# Patient Record
Sex: FEMALE | Race: WHITE | Employment: PART TIME | URBAN - METROPOLITAN AREA
[De-identification: names, ages, dates, MRNs, and addresses within clinical notes are randomized per-mention and may not be internally consistent; named-entity substitution may affect disease eponyms.]

---

## 2017-12-07 ENCOUNTER — HOSPITAL ENCOUNTER (OUTPATIENT)
Dept: MAMMOGRAPHY | Age: 57
Discharge: HOME | End: 2017-12-07
Attending: SURGERY
Payer: COMMERCIAL

## 2017-12-07 DIAGNOSIS — Z12.31 VISIT FOR SCREENING MAMMOGRAM: ICD-10-CM

## 2017-12-07 PROCEDURE — 77063 BREAST TOMOSYNTHESIS BI: CPT

## 2017-12-22 ENCOUNTER — OFFICE VISIT (OUTPATIENT)
Dept: OBGYN CLINIC | Age: 57
End: 2017-12-22

## 2017-12-22 VITALS
DIASTOLIC BLOOD PRESSURE: 76 MMHG | WEIGHT: 172 LBS | SYSTOLIC BLOOD PRESSURE: 118 MMHG | BODY MASS INDEX: 30.48 KG/M2 | HEIGHT: 63 IN

## 2017-12-22 DIAGNOSIS — Z13.820 SCREENING FOR OSTEOPOROSIS: ICD-10-CM

## 2017-12-22 DIAGNOSIS — Z01.419 ENCOUNTER FOR GYNECOLOGICAL EXAMINATION WITHOUT ABNORMAL FINDING: Primary | ICD-10-CM

## 2017-12-22 NOTE — PROGRESS NOTES
Annual exam ages 40-58    Ariana Toledo is a ,  65 y.o. female [facility] Patient's last menstrual period was 2012. .    She presents for her annual checkup. She is having no significant problems. With regard to the Gardasil vaccine, she is older than the age for which it is FDA approved. Menstrual status:    Her periods are nonexistent in flow due to menopause. She denies dysmenorrhea. She reports no premenstrual symptoms. Contraception:    The current method of family planning is tubal ligation and post menopausal status. Sexual history:    She  reports that she currently engages in sexual activity and has had male partners. She reports using the following method of birth control/protection: Surgical.    Medical conditions:    Since her last annual GYN exam about one year ago, she has not the following changes in her health history: none. Pap and Mammogram History:    Her most recent Pap smear was normal, obtained 2 year(s) ago. The patient had a recent mammogram on 17 which was negative for malignancy. Breast Cancer History/Substance Abuse: positive breast cancer in herself    Osteoporosis History:    Family history does not include a first or second degree relative with osteopenia or osteoporosis. A bone density scan has not been obtained.     Past Medical History:   Diagnosis Date    Breast cancer ([address]     LEFT breast     Mononucleosis 3/27/13    Radiation therapy complication 3337    left breast ca     Past Surgical History:   Procedure Laterality Date    BIOPSY OF BREAST, NEEDLE CORE  2010    infiltrating ductal carcinoma Dr Yenny Vick COLONOSCOPY      normal, repaeat in 5 years    HX BREAST BIOPSY Left     HX BREAST BIOPSY Left     neg; stereotactic bx    HX BREAST LUMPECTOMY Left     LEFT--Dr. Jules Guthrie    HX CHOLECYSTECTOMY  2008    HX DILATION AND CURETTAGE  14    with hysteroscopy for thickened endometrium  REMOVAL GALLBLADDER           Allergies: Review of patient's allergies indicates no known allergies. Tobacco History:  reports that she is a non-smoker but has been exposed to tobacco smoke. She has never used smokeless tobacco.  Alcohol Abuse:  reports that she does not drink alcohol. Drug Abuse:  reports that she does not use illicit drugs.     Family Medical/Cancer History:   Family History   Problem Relation Age of Onset    Stomach Cancer Mother     Breast Cancer Other      Aunt    Breast Cancer Maternal Aunt 79        Review of Systems - History obtained from the patient  Constitutional: negative for weight loss, fever, night sweats  HEENT: negative for hearing loss, earache, congestion, snoring, sorethroat  CV: negative for chest pain, palpitations, edema  Resp: negative for cough, shortness of breath, wheezing  GI: negative for change in bowel habits, abdominal pain, black or bloody stools  : negative for frequency, dysuria, hematuria, vaginal discharge  MSK: negative for back pain, joint pain, muscle pain  Breast: negative for breast lumps, nipple discharge, galactorrhea  Skin :negative for itching, rash, hives  Neuro: negative for dizziness, headache, confusion, weakness  Psych: negative for anxiety, depression, change in mood  Heme/lymph: negative for bleeding, bruising, pallor    Physical Exam    Visit Vitals    /76    Ht 5' 3\" (1.6 m)    Wt 172 lb (78 kg)    LMP 11/30/2012    BMI 30.47 kg/m2       Constitutional  · Appearance: well-nourished, well developed, alert, in no acute distress    HENT  · Head and Face: appears normal    Neck  · Inspection/Palpation: normal appearance, no masses or tenderness  · Lymph Nodes: no lymphadenopathy present  · Thyroid: gland size normal, nontender, no nodules or masses present on palpation    Chest  · Respiratory Effort: breathing unlabored  · Auscultation: normal breath sounds    Cardiovascular  · Heart:  · Auscultation: regular rate and rhythm without murmur    Breasts  Inspection of Breasts:left breast with lumpectomy scar, right breast tissue regular, no skin changes, no discharge present, nipple appearance normal, no skin retraction present  Palpation of Breasts and Axillae: no masses present on palpation, no breast tenderness  Axillary Lymph Nodes: no lymphadenopathy present    Gastrointestinal  · Abdominal Examination: abdomen non-tender to palpation, normal bowel sounds, no masses present  · Liver and spleen: no hepatomegaly present, spleen not palpable  · Hernias: no hernias identified    Genitourinary  · External Genitalia: normal appearance for age, no discharge present, no tenderness present, no inflammatory lesions present, no masses present, no atrophy present  · Vagina: normal vaginal vault without central or paravaginal defects, no discharge present, no inflammatory lesions present, no masses present  · Bladder: non-tender to palpation  · Urethra: appears normal  · Cervix: normal   · Uterus: normal size, shape and consistency  · Adnexa: no adnexal tenderness present, no adnexal masses present  · Perineum: perineum within normal limits, no evidence of trauma, no rashes or skin lesions present  · Anus: anus within normal limits, no hemorrhoids present  · Inguinal Lymph Nodes: no lymphadenopathy present    Skin  · General Inspection: no rash, no lesions identified    Neurologic/Psychiatric  · Mental Status:  · Orientation: grossly oriented to person, place and time  · Mood and Affect: mood normal, affect appropriate    Assessment:  Routine gynecologic examination  Her current medical status is satisfactory with no evidence of significant gynecologic issues.     Plan:  Counseled re: diet, exercise, healthy lifestyle  Return for yearly wellness visits  Rec annual mammogram

## 2017-12-29 ENCOUNTER — HOSPITAL ENCOUNTER (OUTPATIENT)
Dept: MAMMOGRAPHY | Age: 57
Discharge: HOME | End: 2017-12-29
Attending: OBSTETRICS & GYNECOLOGY
Payer: COMMERCIAL

## 2017-12-29 DIAGNOSIS — Z13.820 SCREENING FOR OSTEOPOROSIS: ICD-10-CM

## 2017-12-29 PROCEDURE — 77080 DXA BONE DENSITY AXIAL: CPT

## 2018-11-19 ENCOUNTER — OFFICE VISIT (OUTPATIENT)
Dept: FAMILY MEDICINE CLINIC | Age: 58
End: 2018-11-19

## 2018-11-19 ENCOUNTER — TELEPHONE (OUTPATIENT)
Dept: FAMILY MEDICINE CLINIC | Age: 58
End: 2018-11-19

## 2018-11-19 VITALS
RESPIRATION RATE: 16 BRPM | WEIGHT: 182 LBS | TEMPERATURE: 98.6 F | SYSTOLIC BLOOD PRESSURE: 135 MMHG | DIASTOLIC BLOOD PRESSURE: 82 MMHG | BODY MASS INDEX: 32.25 KG/M2 | HEART RATE: 69 BPM | OXYGEN SATURATION: 98 % | HEIGHT: 63 IN

## 2018-11-19 DIAGNOSIS — J02.9 SORE THROAT: Primary | ICD-10-CM

## 2018-11-19 DIAGNOSIS — Z28.21 INFLUENZA VACCINATION DECLINED: ICD-10-CM

## 2018-11-19 LAB — S PYO AG THROAT QL: NEGATIVE

## 2018-11-19 NOTE — TELEPHONE ENCOUNTER
Patient called for results of strep test. Per nurse Holli Blanco. Ask that I inform patient that strep was negative.     Patient understands

## 2018-11-19 NOTE — PROGRESS NOTES
1. Have you been to the ER, urgent care clinic since your last visit? Hospitalized since your last visit? No    2. Have you seen or consulted any other health care providers outside of the [address] [place] [address] since your last visit? Include any pap smears or colon screening. No    Chief Complaint   Patient presents with    Sore Throat     Patient stated she did not want to see a doctor as she has things to do. Stated she just wanted strep test done.     cancer

## 2018-11-19 NOTE — PROGRESS NOTES
Subjective: Denny Hurley is a 63 y.o. female who presents for possible strep infection. Symtoms include sore throat and cought. Has been going on for the past two days. Has not had any fevers, chills or shortness of breath. Recent sick contacts - grand son, is currently on antibiotics. .   Eating and drinking well. Did take some Motrin this morning. Has no other concerns today. Review of Systems   Constitutional: Negative for chills, fever and malaise/fatigue. Respiratory: Positive for cough. Negative for shortness of breath. Cardiovascular: Negative for chest pain. Gastrointestinal: Negative for nausea and vomiting. Genitourinary: Negative for dysuria and urgency. Skin: Negative for itching and rash. PMHx:  Past Medical History:   Diagnosis Date    [facility]    LEFT breast     H/O bone density study 12/29/17 Normal    Mononucleosis 3/27/13    Radiation therapy complication 7111    left breast ca       Meds:       Allergies:   No Known Allergies    Smoker:  Social History     Tobacco Use   Smoking Status Passive Smoke Exposure - Never Smoker   Smokeless Tobacco Never Used       ETOH:   Social History     Substance and Sexual Activity   Alcohol Use No    Comment: social smoker       FH:   Family History   Problem Relation Age of Onset    Stomach Cancer Mother     Breast Cancer Other         Aunt    Breast Cancer Maternal Aunt 70         Objective:     Visit Vitals  /82 (BP 1 Location: Left arm, BP Patient Position: Sitting)   Pulse 69   Temp 98.6 °F (37 °C) (Oral)   Resp 16   Ht 5' 3\" (1.6 m)   Wt 182 lb (82.6 kg)   LMP 11/30/2012   SpO2 98%   BMI 32.24 kg/m²       Physical Exam   Constitutional: She is oriented to person, place, and time and well-developed, well-nourished, and in no distress. No distress. HENT:   Mouth/Throat: Oropharynx is clear and moist. No oropharyngeal exudate. Neck: Normal range of motion. Neck supple.    Cardiovascular: Normal rate, regular rhythm and normal heart sounds. Pulmonary/Chest: Effort normal and breath sounds normal. No respiratory distress. She has no wheezes. Lymphadenopathy:     She has cervical adenopathy. Neurological: She is alert and oriented to person, place, and time. Skin: She is not diaphoretic. Assessment:       ICD-10-CM ICD-9-CM    1. Sore throat J02.9 462 AMB POC RAPID STREP TEST   2. Influenza vaccination declined Z28.21 V64.06            Plan:     POC strep negative   URI: Likely viral in nature  - Advised on symptomatic control with saline rinses and nasal sprays, handout given  - Can use tylenol/motrin as needed for generalized muscle pain and fever  - Can use Sudafed for nasal congestion, Robitussin for cough suppression, and Mucinex for cough expectorant   - Advised on the need to stay well hydrated and that symptoms can last up to 1.5 weeks  - Educated on the lack of benefit of antibiotics in a viral illness        Patient is counseled to return to the office if symptoms do not improve as expected. Urgent consultation with the nearest Emergency Department is strongly recommended if condition worsens. Patient is counseled to follow up as recommended and to inform the office if any changes in treatment are recommended.         Signed By:  Darryl oJy MD    Family Medicine Resident

## 2018-11-19 NOTE — PROGRESS NOTES
63year old female with sore throat and cough  Afebrile  No tonsillar exudate    Rapid strep negative    I reviewed with the resident the medical history and the resident's findings on the physical examination. I discussed with the resident the patient's diagnosis and concur with the plan.

## 2019-01-04 DIAGNOSIS — Z12.39 BREAST CANCER SCREENING: Primary | ICD-10-CM

## 2019-01-10 ENCOUNTER — HOSPITAL ENCOUNTER (OUTPATIENT)
Dept: MAMMOGRAPHY | Age: 59
Discharge: HOME | End: 2019-01-10
Attending: SURGERY
Payer: COMMERCIAL

## 2019-01-10 DIAGNOSIS — Z12.39 SCREENING BREAST EXAMINATION: ICD-10-CM

## 2019-01-10 PROCEDURE — 77063 BREAST TOMOSYNTHESIS BI: CPT

## 2019-01-14 ENCOUNTER — OFFICE VISIT (OUTPATIENT)
Dept: OBGYN CLINIC | Age: 59
End: 2019-01-14

## 2019-01-14 VITALS
SYSTOLIC BLOOD PRESSURE: 136 MMHG | HEIGHT: 63 IN | WEIGHT: 186 LBS | DIASTOLIC BLOOD PRESSURE: 84 MMHG | BODY MASS INDEX: 32.96 KG/M2

## 2019-01-14 DIAGNOSIS — Z01.419 ENCOUNTER FOR GYNECOLOGICAL EXAMINATION (GENERAL) (ROUTINE) WITHOUT ABNORMAL FINDINGS: Primary | ICD-10-CM

## 2019-01-14 NOTE — PATIENT INSTRUCTIONS
Well Visit, Women 48 to 72: Care Instructions  Your Care Instructions    Physical exams can help you stay healthy. Your doctor has checked your overall health and may have suggested ways to take good care of yourself. He or she also may have recommended tests. At home, you can help prevent illness with healthy eating, regular exercise, and other steps. Follow-up care is a key part of your treatment and safety. Be sure to make and go to all appointments, and call your doctor if you are having problems. It's also a good idea to know your test results and keep a list of the medicines you take. How can you care for yourself at home? · Reach and stay at a healthy weight. This will lower your risk for many problems, such as obesity, diabetes, heart disease, and high blood pressure. · Get at least 30 minutes of exercise on most days of the week. Walking is a good choice. You also may want to do other activities, such as running, swimming, cycling, or playing tennis or team sports. · Do not smoke. Smoking can make health problems worse. If you need help quitting, talk to your doctor about stop-smoking programs and medicines. These can increase your chances of quitting for good. · Protect your skin from too much sun. When you're outdoors from 10 a.m. to 4 p.m., stay in the shade or cover up with clothing and a hat with a wide brim. Wear sunglasses that block UV rays. Even when it's cloudy, put broad-spectrum sunscreen (SPF 30 or higher) on any exposed skin. · See a dentist one or two times a year for checkups and to have your teeth cleaned. · Wear a seat belt in the car. · Limit alcohol to 1 drink a day. Too much alcohol can cause health problems. Follow your doctor's advice about when to have certain tests. These tests can spot problems early. · Cholesterol.  Your doctor will tell you how often to have this done based on your age, family history, or other things that can increase your risk for heart attack and stroke. · Blood pressure. Have your blood pressure checked during a routine doctor visit. Your doctor will tell you how often to check your blood pressure based on your age, your blood pressure results, and other factors. · Mammogram. Ask your doctor how often you should have a mammogram, which is an X-ray of your breasts. A mammogram can spot breast cancer before it can be felt and when it is easiest to treat. · Pap test and pelvic exam. Ask your doctor how often you should have a Pap test. You may not need to have a Pap test as often as you used to. · Vision. Have your eyes checked every year or two or as often as your doctor suggests. Some experts recommend that you have yearly exams for glaucoma and other age-related eye problems starting at age 48. · Hearing. Tell your doctor if you notice any change in your hearing. You can have tests to find out how well you hear. · Diabetes. Ask your doctor whether you should have tests for diabetes. · Colon cancer. You should begin tests for colon cancer at age 48. You may have one of several tests. Your doctor will tell you how often to have tests based on your age and risk. Risks include whether you already had a precancerous polyp removed from your colon or whether your parents, sisters and brothers, or children have had colon cancer. · Thyroid disease. Talk to your doctor about whether to have your thyroid checked as part of a regular physical exam. Women have an increased chance of a thyroid problem. · Osteoporosis. You should begin tests for bone density at age 72. If you are younger than 72, ask your doctor whether you have factors that may increase your risk for this disease. You may want to have this test before age 72. · Heart attack and stroke risk. At least every 4 to 6 years, you should have your risk for heart attack and stroke assessed.  Your doctor uses factors such as your age, blood pressure, cholesterol, and whether you smoke or have diabetes to show what your risk for a heart attack or stroke is over the next 10 years. When should you call for help? Watch closely for changes in your health, and be sure to contact your doctor if you have any problems or symptoms that concern you. Where can you learn more? Go to http://omar-heather.info/. Enter U279 in the search box to learn more about \"Well Visit, Women 50 to 72: Care Instructions. \"  Current as of: March 29, 2018  Content Version: 11.8  © 0990-9845 [employer]. Care instructions adapted under license by [employer] (which disclaims liability or warranty for this information). If you have questions about a medical condition or this instruction, always ask your healthcare professional. [address] any warranty or liability for your use of this information.

## 2019-01-14 NOTE — PROGRESS NOTES
Annual exam ages 40-58    Nadia Charles is a ,  63 y.o. female [facility] Patient's last menstrual period was 2012. .    She presents for her annual checkup. She is having no significant problems. With regard to the Gardasil vaccine, she is older than the age for which it is FDA approved. Menstrual status:    Her periods are nonexistent in flow due to menopause. She denies dysmenorrhea. She reports no premenstrual symptoms. Contraception:    The current method of family planning is tubal ligation and post menopausal status. Sexual history:    She  reports that she currently engages in sexual activity and has had partners who are Male. She reports using the following method of birth control/protection: Surgical.    Medical conditions:    Since her last annual GYN exam about one year ago, she has not the following changes in her health history: none. Pap and Mammogram History:    Her most recent Pap smear was normal, obtained 3 year(s) ago. The patient states she has had a recent mammogram. no results seen. .    Breast Cancer History/Substance Abuse: positive breast cancer in herself    Osteoporosis History:    Family history does not include a first or second degree relative with osteopenia or osteoporosis. A bone density scan was obtained 1 year ago and revealed a normal scan.       Past Medical History:   Diagnosis Date    Breast cancer ([address]     LEFT breast     H/O bone density study 17 Normal    Mononucleosis 3/27/13    Radiation therapy complication 2431    left breast ca     Past Surgical History:   Procedure Laterality Date    COLONOSCOPY      normal, repaeat in 5 years    HX BREAST BIOPSY Left     HX BREAST BIOPSY Left     neg; stereotactic bx    HX BREAST LUMPECTOMY Left     LEFT--Dr. Thiago Sanches    HX CHOLECYSTECTOMY  2008    HX DILATION AND CURETTAGE  14    with hysteroscopy for thickened endometrium    [place] BIOPSY OF BREAST, NEEDLE CORE  11/2010    infiltrating ductal carcinoma Dr Iris eDl Real           Allergies: Patient has no known allergies. Tobacco History:  reports that she is a non-smoker but has been exposed to tobacco smoke. she has never used smokeless tobacco.  Alcohol Abuse:  reports that she does not drink alcohol. Drug Abuse:  reports that she does not use drugs.     Family Medical/Cancer History:   Family History   Problem Relation Age of Onset    Stomach Cancer Mother     Breast Cancer Other         Aunt    Breast Cancer Maternal Aunt 79        Review of Systems - History obtained from the patient  Constitutional: negative for weight loss, fever, night sweats  HEENT: negative for hearing loss, earache, congestion, snoring, sorethroat  CV: negative for chest pain, palpitations, edema  Resp: negative for cough, shortness of breath, wheezing  GI: negative for change in bowel habits, abdominal pain, black or bloody stools  : negative for frequency, dysuria, hematuria, vaginal discharge  MSK: negative for back pain, joint pain, muscle pain  Breast: negative for breast lumps, nipple discharge, galactorrhea  Skin :negative for itching, rash, hives  Neuro: negative for dizziness, headache, confusion, weakness  Psych: negative for anxiety, depression, change in mood  Heme/lymph: negative for bleeding, bruising, pallor    Physical Exam    Visit Vitals  /84   Ht 5' 3\" (1.6 m)   Wt 186 lb (84.4 kg)   LMP 11/30/2012   BMI 32.95 kg/m²       Constitutional  · Appearance: well-nourished, well developed, alert, in no acute distress    HENT  · Head and Face: appears normal    Neck  · Inspection/Palpation: normal appearance, no masses or tenderness  · Lymph Nodes: no lymphadenopathy present  · Thyroid: gland size normal, nontender, no nodules or masses present on palpation    Chest  · Respiratory Effort: breathing unlabored  · Auscultation: normal breath sounds    Cardiovascular  · Heart:  · Auscultation: regular rate and rhythm without murmur    Breasts  Inspection of Breasts:left breast with lumpectomy scar, right breast tissue regular, no skin changes, no discharge present, nipple appearance normal, no skin retraction present  Palpation of Breasts and Axillae: no masses present on palpation, no breast tenderness  Axillary Lymph Nodes: no lymphadenopathy present    Gastrointestinal  · Abdominal Examination: abdomen non-tender to palpation, normal bowel sounds, no masses present  · Liver and spleen: no hepatomegaly present, spleen not palpable  · Hernias: no hernias identified    Genitourinary  · External Genitalia: normal appearance for age, no discharge present, no tenderness present, no inflammatory lesions present, no masses present, no atrophy present  · Vagina: normal vaginal vault without central or paravaginal defects, no discharge present, no inflammatory lesions present, no masses present  · Bladder: non-tender to palpation  · Urethra: appears normal  · Cervix: normal   · Uterus: normal size, shape and consistency  · Adnexa: no adnexal tenderness present, no adnexal masses present  · Perineum: perineum within normal limits, no evidence of trauma, no rashes or skin lesions present  · Anus: anus within normal limits, no hemorrhoids present  · Inguinal Lymph Nodes: no lymphadenopathy present    Skin  · General Inspection: no rash, no lesions identified    Neurologic/Psychiatric  · Mental Status:  · Orientation: grossly oriented to person, place and time  · Mood and Affect: mood normal, affect appropriate    Assessment:  Routine gynecologic examination  Her current medical status is satisfactory with no evidence of significant gynecologic issues.     Plan:  Counseled re: diet, exercise, healthy lifestyle  Return for yearly wellness visits  Rec annual mammogram

## 2019-01-16 LAB
CYTOLOGIST CVX/VAG CYTO: NORMAL
CYTOLOGY CVX/VAG DOC THIN PREP: NORMAL
CYTOLOGY HISTORY:: NORMAL
DX ICD CODE: NORMAL
HPV I/H RISK 1 DNA CVX QL PROBE+SIG AMP: NEGATIVE
Lab: NORMAL
OTHER STN SPEC: NORMAL
PATH REPORT.FINAL DX SPEC: NORMAL
PATHOLOGIST CVX/VAG CYTO: NORMAL
STAT OF ADQ CVX/VAG CYTO-IMP: NORMAL

## 2019-10-11 ENCOUNTER — OFFICE VISIT (OUTPATIENT)
Dept: FAMILY MEDICINE CLINIC | Age: 59
End: 2019-10-11

## 2019-10-11 VITALS
OXYGEN SATURATION: 98 % | HEART RATE: 81 BPM | SYSTOLIC BLOOD PRESSURE: 120 MMHG | HEIGHT: 63 IN | DIASTOLIC BLOOD PRESSURE: 84 MMHG | RESPIRATION RATE: 16 BRPM | BODY MASS INDEX: 32.43 KG/M2 | WEIGHT: 183 LBS | TEMPERATURE: 97.8 F

## 2019-10-11 DIAGNOSIS — Z00.00 WELLNESS EXAMINATION: Primary | ICD-10-CM

## 2019-10-11 DIAGNOSIS — E66.09 CLASS 1 OBESITY DUE TO EXCESS CALORIES WITHOUT SERIOUS COMORBIDITY WITH BODY MASS INDEX (BMI) OF 32.0 TO 32.9 IN ADULT: ICD-10-CM

## 2019-10-11 DIAGNOSIS — H35.30 MACULAR DEGENERATION, UNSPECIFIED LATERALITY, UNSPECIFIED TYPE: ICD-10-CM

## 2019-10-11 DIAGNOSIS — Z98.890 H/O LUMPECTOMY: ICD-10-CM

## 2019-10-11 PROBLEM — Z13.820 SCREENING FOR OSTEOPOROSIS: Status: RESOLVED | Noted: 2017-12-22 | Resolved: 2019-10-11

## 2019-10-11 RX ORDER — VITAMIN E 1000 UNIT
CAPSULE ORAL
COMMUNITY

## 2019-10-11 NOTE — PATIENT INSTRUCTIONS
Starting a Weight Loss Plan: Care Instructions  Your Care Instructions    If you are thinking about losing weight, it can be hard to know where to start. Your doctor can help you set up a weight loss plan that best meets your needs. You may want to take a class on nutrition or exercise, or join a weight loss support group. If you have questions about how to make changes to your eating or exercise habits, ask your doctor about seeing a registered dietitian or an exercise specialist.  It can be a big challenge to lose weight. But you do not have to make huge changes at once. Make small changes, and stick with them. When those changes become habit, add a few more changes. If you do not think you are ready to make changes right now, try to pick a date in the future. Make an appointment to see your doctor to discuss whether the time is right for you to start a plan. Follow-up care is a key part of your treatment and safety. Be sure to make and go to all appointments, and call your doctor if you are having problems. It's also a good idea to know your test results and keep a list of the medicines you take. How can you care for yourself at home? · Set realistic goals. Many people expect to lose much more weight than is likely. A weight loss of 5% to 10% of your body weight may be enough to improve your health. · Get family and friends involved to provide support. Talk to them about why you are trying to lose weight, and ask them to help. They can help by participating in exercise and having meals with you, even if they may be eating something different. · Find what works best for you. If you do not have time or do not like to cook, a program that offers meal replacement bars or shakes may be better for you. Or if you like to prepare meals, finding a plan that includes daily menus and recipes may be best.  · Ask your doctor about other health professionals who can help you achieve your weight loss goals. ?  A dietitian can help you make healthy changes in your diet. ? An exercise specialist or  can help you develop a safe and effective exercise program.  ? A counselor or psychiatrist can help you cope with issues such as depression, anxiety, or family problems that can make it hard to focus on weight loss. · Consider joining a support group for people who are trying to lose weight. Your doctor can suggest groups in your area. Where can you learn more? Go to http://omar-heather.info/. Enter H417 in the search box to learn more about \"Starting a Weight Loss Plan: Care Instructions. \"  Current as of: March 28, 2019  Content Version: 12.2  © 8683-5401 [employer]. Care instructions adapted under license by [employer] (which disclaims liability or warranty for this information). If you have questions about a medical condition or this instruction, always ask your healthcare professional. [place] [address] any warranty or liability for your use of this information. Well Visit, Women 48 to 72: Care Instructions  Your Care Instructions    Physical exams can help you stay healthy. Your doctor has checked your overall health and may have suggested ways to take good care of yourself. He or she also may have recommended tests. At home, you can help prevent illness with healthy eating, regular exercise, and other steps. Follow-up care is a key part of your treatment and safety. Be sure to make and go to all appointments, and call your doctor if you are having problems. It's also a good idea to know your test results and keep a list of the medicines you take. How can you care for yourself at home? · Reach and stay at a healthy weight. This will lower your risk for many problems, such as obesity, diabetes, heart disease, and high blood pressure. · Get at least 30 minutes of exercise on most days of the week. Walking is a good choice.  You also may want to do other activities, such as running, swimming, cycling, or playing tennis or team sports. · Do not smoke. Smoking can make health problems worse. If you need help quitting, talk to your doctor about stop-smoking programs and medicines. These can increase your chances of quitting for good. · Protect your skin from too much sun. When you're outdoors from 10 a.m. to 4 p.m., stay in the shade or cover up with clothing and a hat with a wide brim. Wear sunglasses that block UV rays. Even when it's cloudy, put broad-spectrum sunscreen (SPF 30 or higher) on any exposed skin. · See a dentist one or two times a year for checkups and to have your teeth cleaned. · Wear a seat belt in the car. Follow your doctor's advice about when to have certain tests. These tests can spot problems early. · Cholesterol. Your doctor will tell you how often to have this done based on your age, family history, or other things that can increase your risk for heart attack and stroke. · Blood pressure. Have your blood pressure checked during a routine doctor visit. Your doctor will tell you how often to check your blood pressure based on your age, your blood pressure results, and other factors. · Mammogram. Ask your doctor how often you should have a mammogram, which is an X-ray of your breasts. A mammogram can spot breast cancer before it can be felt and when it is easiest to treat. · Pap test and pelvic exam. Ask your doctor how often you should have a Pap test. You may not need to have a Pap test as often as you used to. · Vision. Have your eyes checked every year or two or as often as your doctor suggests. Some experts recommend that you have yearly exams for glaucoma and other age-related eye problems starting at age 48. · Hearing. Tell your doctor if you notice any change in your hearing. You can have tests to find out how well you hear. · Diabetes.  Ask your doctor whether you should have tests for diabetes. · Colorectal cancer. Your risk for colorectal cancer gets higher as you get older. Some experts say that adults should start regular screening at age 48 and stop at age 76. Others say to start before age 48 or continue after age 76. Talk with your doctor about your risk and when to start and stop screening. · Thyroid disease. Talk to your doctor about whether to have your thyroid checked as part of a regular physical exam. Women have an increased chance of a thyroid problem. · Osteoporosis. You should begin tests for bone density at age 72. If you are younger than 72, ask your doctor whether you have factors that may increase your risk for this disease. You may want to have this test before age 72. · Heart attack and stroke risk. At least every 4 to 6 years, you should have your risk for heart attack and stroke assessed. Your doctor uses factors such as your age, blood pressure, cholesterol, and whether you smoke or have diabetes to show what your risk for a heart attack or stroke is over the next 10 years. When should you call for help? Watch closely for changes in your health, and be sure to contact your doctor if you have any problems or symptoms that concern you. Where can you learn more? Go to http://omar-heather.info/. Enter W672 in the search box to learn more about \"Well Visit, Women 50 to 72: Care Instructions. \"  Current as of: December 13, 2018  Content Version: 12.2  © 6427-0433 [employer]. Care instructions adapted under license by [employer] (which disclaims liability or warranty for this information). If you have questions about a medical condition or this instruction, always ask your healthcare professional. [place] [address] any warranty or liability for your use of this information.

## 2019-10-11 NOTE — PROGRESS NOTES
Chief Complaint   Patient presents with    Well Woman     1. Have you been to the ER, urgent care clinic since your last visit? Hospitalized since your last visit? No    2. Have you seen or consulted any other health care providers outside of the [address] [place] [address] since your last visit? Include any pap smears or colon screening.  No

## 2019-10-11 NOTE — PROGRESS NOTES
[address] [facility] [place]    Office [phone], Fax [phone]    Subjective:     Chief Complaint   Patient presents with    Well Woman     History provided by patient     HPI:  Leonel Lock is a 63 y.o. WHITE OR  female presents for wellness visit. Significant history pertinent to presentation includes hx of L lumpectomy s/p radiation, mild macular degeneration. OB/GYN hx:   - . . No longer on tamoxifen. No hx of fibroids, heavy menses, adenomyosis, ob/gyn surghx.   - STD screening: Sexually active: yes ( 38 years). No hx of STDs. Does not want STD screening. Health maintenance:  - Diet: lost 35lbs with changing her diet. Does not eat fast food, tries to limit sweet. Green smoothie. - Exercise: 3 mile 3 x a week  - Flu shot: declined   - Immunization:         - Tdap: 2013        - Shingles: declined        - Hep C screening: done on  (negative)  - Vision check: yearly (mild macular degeneration - being monitored)  - Dental check: every 6 month, works for a dentist  - Cancer screening:        - Breast cancer: gets it yearly (Dr Jay Jay Valdez- ob/gyn, Dr Chapis Ross - breast surgeon). last mammogram in Dec 2019: normal       - Cervical cancer: last pap 3 years ago: normal       - Colon cancer: last colonoscopy on  (Dr Markell Glynn): normal. Next due on .  - Mood: good. Giving care to her dad. Fmhx: grandfather/brother (DM), stomach cancer (59). Medication reviewed. Allergy reviewed. SocHx. - Denies smoking, alcohol use, illcit drug use.    - Sexually active: yes  - Occupation: dentist office     ROS (bolded are positive):   General Negative for fever, chills, changes in weight, changes in appetite   HEENT Negative for hearing loss, earache, congestion, sore throat   CV Negative for chest pain, palpitations, edema   Respiratory Negative for cough, shortness of breath, wheezing   GI Negative for change in bowel habits, abdominal pain, black or bloody stools, nausea or vomiting    Negative for frequency, dysuria, hematuria, vaginal discharge   MSK Negative for back pain, joint pain, muscle pain   Breast Negative for breast lumps, nipple discharge, galactorrhea   Skin Negative for itching, rash, hives   Neuro Negative for dizziness, headache, confusion, weakness   Psych Negative for anxiety, depression, change in mood     Objective:   Vitals - reviewed  Visit Vitals  /84   Pulse 81   Temp 97.8 °F (36.6 °C) (Oral)   Resp 16   Ht 5' 3\" (1.6 m)   Wt 183 lb (83 kg)   LMP 11/30/2012   SpO2 98%   BMI 32.42 kg/m²        Physical exam:   GEN: NAD. Alert. Well nourished. obese  EYES:  Conjunctiva clear; PERRLA. extraocular movements are intact. EAR: External ears are normal. Tympanic membranes are clear and without effusion. NOSE: Turbinates are within normal limits. No drainage  OROPHYARYNX: No oral lesions or exudates. NECK:  Supple; no masses; thyroid normal           LUNGS: Respirations unlabored; CTAB. no wheeze, rales, rhonchi   CARDIOVASCULAR: Regular, rate, and rhythm without murmurs, gallops or rubs   ABDOMEN: Soft; NT, ND. +bowel sounds; no rebound tenderness, no guarding. no masses or organomegaly  NEUROLOGIC:  No focal neurologic deficits. Strength and sensation grossly intact. MSK: FROM in all extremities (both passive and active). Good tone. No vertebral tenderness. EXT: Well perfused. No edema. No erythema. PSYCH: appropriate mood and affect. Good insight and judgement. Cooperative. SKIN: No obvious rashes or lesions. Pertinent Labs/Studies:   - n/a    Assessment and orders:       ICD-10-CM ICD-9-CM    1. Wellness examination F92.16 H07.7 METABOLIC PANEL, BASIC   2. Class 1 obesity due to excess calories without serious comorbidity with body mass index (BMI) of 32.0 to 32.9 in adult E66.09 278.00 LIPID PANEL    Z68.32 V85.32    3. Macular degeneration, unspecified laterality, unspecified type H35.30 362.50    4.  H/O lumpectomy Z98.890 V45.89 Diagnoses and all orders for this visit:    1. Wellness examination. Doing well. Not on any medication. Watches what she eats but tends to have excess calories. Aware. Discussed foods items to try to minimize (sweets). To come back in 3 month to re-evaluate. F/u labs as below to screen for diabetes and lipid panel. Declined flu shot and shingles immunization. Has ob/gyn for women health. Has had all age appropriate screening.   -     METABOLIC PANEL, BASIC    2. Class 1 obesity due to excess calories without serious comorbidity with body mass index (BMI) of 32.0 to 32.9 in adult  -     LIPID PANEL    3. Macular degeneration, unspecified laterality, unspecified type. [facility]. Asymptomatic. 4. Hx of breast lumpectomy due to breast cancer. S/p tamoxifen. Follow-up and Dispositions    · Return in about 3 months (around 1/11/2020) for weight loss. Pt was discussed with Dr Jorge Orantes (attending physician). I have reviewed patient medical and social history and medications. I have reviewed pertinent labs results and other data. I have discussed the diagnosis with the patient and the intended plan as seen in the above orders. The patient has received an after-visit summary and questions were answered concerning future plans. I have discussed medication side effects and warnings with the patient as well.     Roxann Meek MD  Resident [address] [place] [address]  10/11/19

## 2019-10-11 NOTE — PROGRESS NOTES
64 yo female here for annual exam    Working on her diet -- has lost 30+ pounds    Declines influenza and TDAP vaccines    Hx:  Breast cancer left s/p lumpectomy    Labs today    I reviewed with the resident the medical history and the resident's findings on the physical examination. I discussed with the resident the patient's diagnosis and concur with the plan.

## 2019-10-12 LAB
BUN SERPL-MCNC: 11 MG/DL (ref 6–24)
BUN/CREAT SERPL: 19 (ref 9–23)
CALCIUM SERPL-MCNC: 9.9 MG/DL (ref 8.7–10.2)
CHLORIDE SERPL-SCNC: 102 MMOL/L (ref 96–106)
CHOLEST SERPL-MCNC: 259 MG/DL (ref 100–199)
CO2 SERPL-SCNC: 26 MMOL/L (ref 20–29)
CREAT SERPL-MCNC: 0.58 MG/DL (ref 0.57–1)
GLUCOSE SERPL-MCNC: 93 MG/DL (ref 65–99)
HDLC SERPL-MCNC: 66 MG/DL
INTERPRETATION, 910389: NORMAL
LDLC SERPL CALC-MCNC: 170 MG/DL (ref 0–99)
POTASSIUM SERPL-SCNC: 4.4 MMOL/L (ref 3.5–5.2)
SODIUM SERPL-SCNC: 141 MMOL/L (ref 134–144)
TRIGL SERPL-MCNC: 114 MG/DL (ref 0–149)
VLDLC SERPL CALC-MCNC: 23 MG/DL (ref 5–40)

## 2020-01-15 ENCOUNTER — HOSPITAL ENCOUNTER (OUTPATIENT)
Dept: MAMMOGRAPHY | Age: 60
Discharge: HOME | End: 2020-01-15
Attending: SURGERY
Payer: COMMERCIAL

## 2020-01-15 DIAGNOSIS — Z12.31 VISIT FOR SCREENING MAMMOGRAM: ICD-10-CM

## 2020-01-15 PROCEDURE — 77063 BREAST TOMOSYNTHESIS BI: CPT

## 2020-08-05 ENCOUNTER — DOCUMENTATION ONLY (OUTPATIENT)
Dept: SURGERY | Age: 60
End: 2020-08-05

## 2020-08-05 NOTE — PROGRESS NOTES
Received a faxed request for medical records on 8/1/20 for the last 5 years from [facility] [address]. Request was faxed to [employer] at [phone] on 8/5/20.

## 2020-08-10 ENCOUNTER — DOCUMENTATION ONLY (OUTPATIENT)
Dept: SURGERY | Age: 60
End: 2020-08-10

## 2020-10-22 NOTE — PROGRESS NOTES
Annual exam ages 40-58      Irena Jacobsen is a ,  62 y.o. female   Patient's last menstrual period was 2012. She presents for her annual checkup. She is having no significant problems. With regard to the Gardasil vaccine, she is older than the FDA approved age to receive it. Menstrual status:    Her periods are absent in flow due to menopause     She reports no premenstrual symptoms. Contraception:    The current method of family planning is NA post menopause. Hormonal status:  She reports no perimenstrual type symptoms. She is not having vasomotor symptoms. The patient is not using any ERT. Sexual history:    She  reports being sexually active and has had partner(s) who are Male. She reports using the following method of birth control/protection: Surgical.    Medical conditions:    Since her last annual GYN exam about one year ago, she has not the following changes in her health history: none. Surgical history confirmed with patient. has a past surgical history that includes colonoscopy (); hx cholecystectomy (2008); pr biopsy of breast, needle core (2010); hx dilation and curettage (14); hx breast lumpectomy (Left, ); hx breast biopsy (Left, ); and hx breast biopsy (Left, ). Pap and Mammogram History:    Her most recent Pap smear was normal, obtained 2 year(s) ago. The patient had a recent mammogram on 1/15/2020 which was negative for malignancy. Breast Cancer History/Substance Abuse: positive breast cancer in herself      Osteoporosis History:    Family history does not include a first or second degree relative with osteopenia or osteoporosis. A bone density scan was obtained in  and revealed a normal scan. She is currently taking calcium and vit D.     Past Medical History:   Diagnosis Date    Breast cancer ([facility] [address]     LEFT breast     H/O bone density study 17 Normal    Mononucleosis 3/27/13    Radiation therapy complication 4543    left breast ca     Past Surgical History:   Procedure Laterality Date    COLONOSCOPY  6/11    normal, repaeat in 5 years    HX BREAST BIOPSY Left 2010    HX BREAST BIOPSY Left 2016    neg; stereotactic bx    HX BREAST LUMPECTOMY Left 2010    LEFT--Dr. Tomy Nelson    HX CHOLECYSTECTOMY  09/2008    HX DILATION AND CURETTAGE  6/5/14    with hysteroscopy for thickened endometrium    ID BIOPSY OF BREAST, NEEDLE CORE  11/2010    infiltrating ductal carcinoma Dr Tyler Hubbard       Current Outpatient Medications   Medication Sig Dispense Refill    omega-3/dha/epa/fish oil (OMEGA-3 PO) Take  by mouth.  ascorbic acid, vitamin C, (VITAMIN C) 1,000 mg tablet Take  by mouth.  vitamin E acetate (VITAMIN E PO) Take  by mouth.  CYANOCOBALAMIN, VITAMIN B-12, PO Take  by mouth. Allergies: Patient has no known allergies. Tobacco History:  reports that she is a non-smoker but has been exposed to tobacco smoke. She has never used smokeless tobacco.  Alcohol Abuse:  reports no history of alcohol use. Drug Abuse:  reports no history of drug use.     Family Medical/Cancer History:   Family History   Problem Relation Age of Onset    Stomach Cancer Mother     Breast Cancer Other         Aunt    Breast Cancer Maternal Aunt 79        Review of Systems - History obtained from the patient  Constitutional: negative for weight loss, fever, night sweats  HEENT: negative for hearing loss, earache, congestion, snoring, sorethroat  CV: negative for chest pain, palpitations, edema  Resp: negative for cough, shortness of breath, wheezing  GI: negative for change in bowel habits, abdominal pain, black or bloody stools  : negative for frequency, dysuria, hematuria, vaginal discharge  MSK: negative for back pain, joint pain, muscle pain  Breast: negative for breast lumps, nipple discharge, galactorrhea  Skin :negative for itching, rash, hives  Neuro: negative for dizziness, headache, confusion, weakness  Psych: negative for anxiety, depression, change in mood  Heme/lymph: negative for bleeding, bruising, pallor    Physical Exam    Visit Vitals  /78   Ht 5' 3\" (1.6 m)   Wt 185 lb (83.9 kg)   LMP 11/30/2012   BMI 32.77 kg/m²       Constitutional  · Appearance: well-nourished, well developed, alert, in no acute distress    HENT  · Head and Face: appears normal    Neck  · Inspection/Palpation: normal appearance, no masses or tenderness  · Lymph Nodes: no lymphadenopathy present  · Thyroid: gland size normal, nontender, no nodules or masses present on palpation    Chest  · Respiratory Effort: breathing unlabored  · Auscultation: normal breath sounds    Cardiovascular  · Heart:  · Auscultation: regular rate and rhythm without murmur    Breasts  · Inspection of Breasts: breasts symmetrical, no skin changes, no discharge present, nipple appearance normal, no skin retraction present  · Palpation of Breasts and Axillae: no masses present on palpation, no breast tenderness  · Axillary Lymph Nodes: no lymphadenopathy present    Gastrointestinal  · Abdominal Examination: abdomen non-tender to palpation, normal bowel sounds, no masses present  · Liver and spleen: no hepatomegaly present, spleen not palpable  · Hernias: no hernias identified    Genitourinary  · External Genitalia: normal appearance for age, no discharge present, no tenderness present, no inflammatory lesions present, no masses present, no atrophy present  · Vagina: normal vaginal vault without central or paravaginal defects, no discharge present, no inflammatory lesions present, no masses present  · Bladder: non-tender to palpation  · Urethra: appears normal  · Cervix: normal   · Uterus: normal size, shape and consistency  · Adnexa: no adnexal tenderness present, no adnexal masses present  · Perineum: perineum within normal limits, no evidence of trauma, no rashes or skin lesions present  · Anus: anus within normal limits, no hemorrhoids present  · Inguinal Lymph Nodes: no lymphadenopathy present    Skin  · General Inspection: no rash, no lesions identified    Neurologic/Psychiatric  · Mental Status:  · Orientation: grossly oriented to person, place and time  · Mood and Affect: mood normal, affect appropriate    Assessment:  Routine gynecologic examination  Her current medical status is satisfactory with no evidence of significant gynecologic issues.     Plan:  Counseled re: diet, exercise, healthy lifestyle  Return for yearly wellness visits  Rec annual mammogram

## 2020-10-22 NOTE — PATIENT INSTRUCTIONS
Well Visit, Women 48 to 72: Care Instructions Your Care Instructions Physical exams can help you stay healthy. Your doctor has checked your overall health and may have suggested ways to take good care of yourself. He or she also may have recommended tests. At home, you can help prevent illness with healthy eating, regular exercise, and other steps. Follow-up care is a key part of your treatment and safety. Be sure to make and go to all appointments, and call your doctor if you are having problems. It's also a good idea to know your test results and keep a list of the medicines you take. How can you care for yourself at home? · Reach and stay at a healthy weight. This will lower your risk for many problems, such as obesity, diabetes, heart disease, and high blood pressure. · Get at least 30 minutes of exercise on most days of the week. Walking is a good choice. You also may want to do other activities, such as running, swimming, cycling, or playing tennis or team sports. · Do not smoke. Smoking can make health problems worse. If you need help quitting, talk to your doctor about stop-smoking programs and medicines. These can increase your chances of quitting for good. · Protect your skin from too much sun. When you're outdoors from 10 a.m. to 4 p.m., stay in the shade or cover up with clothing and a hat with a wide brim. Wear sunglasses that block UV rays. Even when it's cloudy, put broad-spectrum sunscreen (SPF 30 or higher) on any exposed skin. · See a dentist one or two times a year for checkups and to have your teeth cleaned. · Wear a seat belt in the car. Follow your doctor's advice about when to have certain tests. These tests can spot problems early. · Cholesterol. Your doctor will tell you how often to have this done based on your age, family history, or other things that can increase your risk for heart attack and stroke. · Blood pressure. Have your blood pressure checked during a routine doctor visit. Your doctor will tell you how often to check your blood pressure based on your age, your blood pressure results, and other factors. · Mammogram. Ask your doctor how often you should have a mammogram, which is an X-ray of your breasts. A mammogram can spot breast cancer before it can be felt and when it is easiest to treat. · Pap test and pelvic exam. Ask your doctor how often you should have a Pap test. You may not need to have a Pap test as often as you used to. · Vision. Have your eyes checked every year or two or as often as your doctor suggests. Some experts recommend that you have yearly exams for glaucoma and other age-related eye problems starting at age 48. · Hearing. Tell your doctor if you notice any change in your hearing. You can have tests to find out how well you hear. · Diabetes. Ask your doctor whether you should have tests for diabetes. · Colorectal cancer. Your risk for colorectal cancer gets higher as you get older. Some experts say that adults should start regular screening at age 48 and stop at age 76. Others say to start before age 48 or continue after age 76. Talk with your doctor about your risk and when to start and stop screening. · Thyroid disease. Talk to your doctor about whether to have your thyroid checked as part of a regular physical exam. Women have an increased chance of a thyroid problem. · Osteoporosis. You should begin tests for bone density at age 72. If you are younger than 72, ask your doctor whether you have factors that may increase your risk for this disease. You may want to have this test before age 72. · Heart attack and stroke risk. At least every 4 to 6 years, you should have your risk for heart attack and stroke assessed.  Your doctor uses factors such as your age, blood pressure, cholesterol, and whether you smoke or have diabetes to show what your risk for a heart attack or stroke is over the next 10 years. When should you call for help? Watch closely for changes in your health, and be sure to contact your doctor if you have any problems or symptoms that concern you. Where can you learn more? Go to http://www.gray.com/ Enter G906 in the search box to learn more about \"Well Visit, Women 50 to 72: Care Instructions. \" Current as of: May 27, 2020               Content Version: 12.6 © 0345-7244 [employer]. Care instructions adapted under license by [employer] (which disclaims liability or warranty for this information). If you have questions about a medical condition or this instruction, always ask your healthcare professional. [address] any warranty or liability for your use of this information.

## 2020-10-26 ENCOUNTER — OFFICE VISIT (OUTPATIENT)
Dept: OBGYN CLINIC | Age: 60
End: 2020-10-26
Payer: COMMERCIAL

## 2020-10-26 VITALS
WEIGHT: 185 LBS | DIASTOLIC BLOOD PRESSURE: 78 MMHG | HEIGHT: 63 IN | BODY MASS INDEX: 32.78 KG/M2 | SYSTOLIC BLOOD PRESSURE: 128 MMHG

## 2020-10-26 DIAGNOSIS — Z01.419 ENCOUNTER FOR GYNECOLOGICAL EXAMINATION (GENERAL) (ROUTINE) WITHOUT ABNORMAL FINDINGS: Primary | ICD-10-CM

## 2020-10-26 PROCEDURE — 99396 PREV VISIT EST AGE 40-64: CPT | Performed by: OBSTETRICS & GYNECOLOGY

## 2020-12-02 ENCOUNTER — TRANSCRIBE ORDER (OUTPATIENT)
Dept: SCHEDULING | Age: 60
End: 2020-12-02

## 2020-12-02 DIAGNOSIS — Z12.31 SCREENING MAMMOGRAM FOR HIGH-RISK PATIENT: Primary | ICD-10-CM

## 2021-02-25 ENCOUNTER — HOSPITAL ENCOUNTER (OUTPATIENT)
Dept: MAMMOGRAPHY | Age: 61
Discharge: HOME | End: 2021-02-25
Attending: SURGERY
Payer: COMMERCIAL

## 2021-02-25 DIAGNOSIS — Z12.31 SCREENING MAMMOGRAM FOR HIGH-RISK PATIENT: ICD-10-CM

## 2021-02-25 PROCEDURE — 77063 BREAST TOMOSYNTHESIS BI: CPT

## 2021-05-12 ENCOUNTER — VIRTUAL VISIT (OUTPATIENT)
Dept: FAMILY MEDICINE CLINIC | Age: 61
End: 2021-05-12
Payer: COMMERCIAL

## 2021-05-12 ENCOUNTER — TELEPHONE (OUTPATIENT)
Dept: FAMILY MEDICINE CLINIC | Age: 61
End: 2021-05-12

## 2021-05-12 DIAGNOSIS — R22.42 ANKLE MASS, LEFT: Primary | ICD-10-CM

## 2021-05-12 PROCEDURE — 99213 OFFICE O/P EST LOW 20 MIN: CPT | Performed by: FAMILY MEDICINE

## 2021-05-12 NOTE — PROGRESS NOTES
Holly Marie  62 y.o. female  1960  [place] [address]  [id number]   [address]:    Telemedicine Progress Note  Hunter Caba MD       Encounter Date and Time: May 12, 2021 at 8:16 AM    Consent: Holly Marie, who was seen by synchronous (real-time) audio-video technology, and/or her healthcare decision maker, is aware that this patient-initiated, Telehealth encounter on 5/12/2021 is a billable service, with coverage as determined by her insurance carrier. She is aware that she may receive a bill and has provided verbal consent to proceed: Yes. Chief Complaint   Patient presents with    Mass     History of Present Illness   Holly Marie is a 62 y.o. female was evaluated by synchronous (real-time) audio-video technology from home, through a secure patient portal.    Holly Marie is a 62 y.o. female complains large vein in left lower leg. Burning feeling, not sure if this is going to pop. Saw podiatrist and was told it was a fat pocket. They just gave her an arch support. Date of Onset: 2 years ago in [facility]  Mechanism of Injury: Long hike but thought shoe lace aggravated it because it kept coming untied   Alleviating Factors: Not walking on it  Aggravating Factors:  When taking walks, about 3 miles a day, certain shoes     Does not report any varicose veins. Denies back pain.       Review of Systems   ROS    Vitals/Objective:     General: alert, cooperative, no distress   Mental  status: mental status: alert, oriented to person, place, and time, normal mood, behavior, speech, dress, motor activity, and thought processes   Resp: resp: normal effort and no respiratory distress   Neuro: neuro: no gross deficits   Skin: skin: no discoloration or lesions of concern on visible areas     Left lateral ankle mass noted appears fluctuant, no erythema    Due to this being a TeleHealth evaluation, many elements of the physical examination are unable to be assessed. Assessment and Plan:   Time-based coding, delete if not needed: I spent at least 25 minutes with this established patient, and >50% of the time was spent counseling and/or coordinating care regarding new lesion     1. Ankle mass, left  -Come in for diagnostic ultrasound and if fluid, patient would like to try aspiration and wrapping. If lipoma, consider general surgery and if degloving lesion, consider plastic vs ortho. Assessment/Plan:  Office to call and set up appt with me. Time spent in direct conversation with the patient to include medical condition(s) discussed, assessment and treatment plan:       We discussed the expected course, resolution and complications of the diagnosis(es) in detail. Medication risks, benefits, costs, interactions, and alternatives were discussed as indicated. I advised her to contact the office if her condition worsens, changes or fails to improve as anticipated. She expressed understanding with the diagnosis(es) and plan. Patient understands that this encounter was a temporary measure, and the importance of further follow up and examination was emphasized. Patient verbalized understanding. Patient informed to follow up: Catalina Sinha Follow-up and Dispositions  ·   Return in about 2 weeks (around 5/26/2021) for Ultrasound +/- aspiration . Electronically Signed: Liz Adam MD    CPT Codes 14120-23742 for Established Patients may apply to this Telehealth Visit. POS code: 18. Modifier GT    Angelo Tran is a 62 y.o. female who was evaluated by an audio-video encounter for concerns as above. Patient identification was verified prior to start of the visit. A caregiver was present when appropriate. Due to this being a TeleHealth encounter (During DJKNT-14 public health emergency), evaluation of the following organ systems was limited: Vitals/Constitutional/EENT/Resp/CV/GI//MS/Neuro/Skin/Heme-Lymph-Imm.   Pursuant to the emergency declaration under the [facility], [facility] waiver authority and the [employer] and [employer] Act, this Virtual Visit was conducted, with patient's (and/or legal guardian's) consent, to reduce the patient's risk of exposure to COVID-19 and provide necessary medical care. Services were provided through a synchronous discussion virtually to substitute for in-person clinic visit. I was at home. The patient was at home. History   Patients past medical, surgical and family histories were reviewed and updated.       Past Medical History:   Diagnosis Date    Breast cancer ([facility] 2010    LEFT breast     H/O bone density study 12/29/17 Normal    Mononucleosis 3/27/13    Radiation therapy complication 7820    left breast ca     Past Surgical History:   Procedure Laterality Date    COLONOSCOPY  6/11    normal, repaeat in 5 years    HX BREAST BIOPSY Left 2010    HX BREAST BIOPSY Left 2016    neg; stereotactic bx    HX BREAST LUMPECTOMY Left 2010    LEFT--Dr. Ovi Enamorado    HX CHOLECYSTECTOMY  09/2008    HX DILATION AND CURETTAGE  6/5/14    with hysteroscopy for thickened endometrium    [place] BIOPSY OF BREAST, NEEDLE CORE  11/2010    infiltrating ductal carcinoma Dr Kostas Cote     Family History   Problem Relation Age of Onset    Stomach Cancer Mother     Breast Cancer Other         Aunt    Breast Cancer Maternal Aunt 79     Social History     Tobacco Use    Smoking status: Passive Smoke Exposure - Never Smoker    Smokeless tobacco: Never Used   Substance Use Topics    Alcohol use: No     Comment: social smoker    Drug use: No     Patient Active Problem List   Diagnosis Code    Breast cancer ([facility] C50.919    S/P colonoscopy Z98.890    RBBB I45.10    H/O lumpectomy Z98.890          Current Medications/Allergies   Medications and Allergies reviewed:    Current Outpatient Medications   Medication Sig Dispense Refill    omega-3/dha/epa/fish oil (OMEGA-3 PO) Take  by mouth.  ascorbic acid, vitamin C, (VITAMIN C) 1,000 mg tablet Take  by mouth.  vitamin E acetate (VITAMIN E PO) Take  by mouth.  CYANOCOBALAMIN, VITAMIN B-12, PO Take  by mouth.        No Known Allergies

## 2021-05-12 NOTE — TELEPHONE ENCOUNTER
Called, no answer. [address]  ===View-only below this line===  ----- Message -----  From: Dioxn Haney MD  Sent: 5/12/2021   8:33 AM EDT  To: [facility]    Please call patient to schedule an appointment with me for a diagnostic ultrasound of her ankle +/- needle aspiration. 20 min appt should be fine.   Thank you

## 2021-05-12 NOTE — TELEPHONE ENCOUNTER
Called x 3, left 1 voice mail. Dr. Bob Emerson Telephone---5/12  Received: Today  Message Contents   Gianni, [address] Message/Vendor Calls     Caller's first and last name: Pt       Reason for call: Returning missed call. Callback required yes/no and why: Yes; to schedule       Best contact number(s): [phone]       Details to clarify the request: Pt was needing an in office appt for ultrasound on her ankle.        Laura Martinez

## 2021-05-25 ENCOUNTER — OFFICE VISIT (OUTPATIENT)
Dept: FAMILY MEDICINE CLINIC | Age: 61
End: 2021-05-25
Payer: COMMERCIAL

## 2021-05-25 VITALS
BODY MASS INDEX: 32.96 KG/M2 | WEIGHT: 186 LBS | OXYGEN SATURATION: 97 % | HEART RATE: 77 BPM | SYSTOLIC BLOOD PRESSURE: 123 MMHG | TEMPERATURE: 98.2 F | RESPIRATION RATE: 20 BRPM | HEIGHT: 63 IN | DIASTOLIC BLOOD PRESSURE: 79 MMHG

## 2021-05-25 DIAGNOSIS — D17.24 LIPOMA OF LEFT LOWER EXTREMITY: Primary | ICD-10-CM

## 2021-05-25 DIAGNOSIS — R22.42 ANKLE MASS, LEFT: ICD-10-CM

## 2021-05-25 PROCEDURE — 76882 US LMTD JT/FCL EVL NVASC XTR: CPT | Performed by: FAMILY MEDICINE

## 2021-05-25 PROCEDURE — 99213 OFFICE O/P EST LOW 20 MIN: CPT | Performed by: FAMILY MEDICINE

## 2021-05-25 NOTE — PROGRESS NOTES
History of Present Illness     Chief Complaint   Patient presents with    Ankle Pain     x2 years on the left side, area gets swollen and irritated, currently not super inflammed per patient. Patient Identification  Dustin Guillaume is a 62 y.o. female complains of pain in the left ankle pain. Date of Onset: 2 [place] ago [place]  Mechanism of Injury: Long hike but thought shoe lace aggravated it because it kept coming untied   Alleviating Factors: Not walking on it  Aggravating Factors:  When taking walks, about 3 miles a day, certain shoes       Past Medical History:   Diagnosis Date    Breast cancer ([facility] 2010    LEFT breast     H/O bone density study 12/29/17 Normal    Mononucleosis 3/27/13    Radiation therapy complication 5412    left breast ca     Family History   Problem Relation Age of Onset    Stomach Cancer Mother     Breast Cancer Other         Aunt    Breast Cancer Maternal Aunt 79     Current Outpatient Medications   Medication Sig Dispense Refill    ferrous sulfate (IRON PO) Take  by mouth.  CALCIUM PO Take  by mouth.  omega-3/dha/epa/fish oil (OMEGA-3 PO) Take  by mouth.  ascorbic acid, vitamin C, (VITAMIN C) 1,000 mg tablet Take  by mouth.  vitamin E acetate (VITAMIN E PO) Take  by mouth.  CYANOCOBALAMIN, VITAMIN B-12, PO Take  by mouth. No Known Allergies    Review of Systems  A comprehensive review of systems was negative except for that written in the HPI. Physical Exam     Visit Vitals  /79 (BP 1 Location: Right arm, BP Patient Position: Sitting)   Pulse 77   Temp 98.2 °F (36.8 °C) (Oral)   Resp 20   Ht 5' 3\" (1.6 m)   Wt 186 lb (84.4 kg)   LMP 11/30/2012   SpO2 97%   BMI 32.95 kg/m²     GEN: Well appearing. No apparent distress. Responds to all questions appropriately. Lungs: No labored respirations. Talking in  complete sentences without difficulty.     Ankle/Foot: Left  Ankle Effusion: None  Great Toe MTP (normal/valgus/hallux rigidis/varus): Normal    Dynamic Test:  Gait: Normal    Limited Ultrasound    Indication: Left Ankle Mass    Ultrasound evaluation was performed using the Bevalley system. A limited ultrasound of the left ankle mass was performed using a linear probe. Pertinent findings include thickening in subcutaneous and lipoma measuring 3cm x 3.25cm x 0.45cm. No ankle effusion appreciated. Impression: Lipoma     Ultrasound Performed and Interpreted by:  Ferna Severin, MD, FAAFP, CAQSM, RMSK    Assessment:    ICD-10-CM ICD-9-CM    1. Lipoma of left lower extremity  D17.24 214.1 REFERRAL TO GENERAL SURGERY   2. Ankle mass, left  R22.42 719.67        Plan:  -No injection for injection, aspiration or wrapping  -Refer to General Surgery     Follow-up and Dispositions    · Return in about 1 month (around 6/25/2021) for Annual Wellness with Dr. Eber Porter .

## 2021-05-25 NOTE — PROGRESS NOTES
Identified Patient with two Patient identifiers (Name and ). Two Patient Identifiers confirmed. Reviewed record in preparation for visit and have obtained necessary documentation. Chief Complaint   Patient presents with    Ankle Pain     x2 years on the left side, area gets swollen and irritated, currently not super inflammed per patient. Visit Vitals  /79 (BP 1 Location: Right arm, BP Patient Position: Sitting)   Pulse 77   Temp 98.2 °F (36.8 °C) (Oral)   Resp 20   Ht 5' 3\" (1.6 m)   Wt 186 lb (84.4 kg)   SpO2 97%   BMI 32.95 kg/m²       1. Have you been to the ER, urgent care clinic since your last visit? Hospitalized since your last visit? No    2. Have you seen or consulted any other health care providers outside of the [address] [place] [address] since your last visit? Include any pap smears or colon screening.  No

## 2021-06-22 ENCOUNTER — TELEPHONE (OUTPATIENT)
Dept: SURGERY | Age: 61
End: 2021-06-22

## 2021-06-22 NOTE — TELEPHONE ENCOUNTER
[facility] regarding a referral to see Dr Rosendo Sanchez. Patient declined making an appointment and stated that she went to another facility for her Lipoma.

## 2021-06-22 NOTE — TELEPHONE ENCOUNTER
Called pt to set to appt with Dr Dianna Lopez for Lipoma of left lower extremity - Referred by [place] [facility] MD    No answer - M

## 2021-12-27 ENCOUNTER — VIRTUAL VISIT (OUTPATIENT)
Dept: FAMILY MEDICINE CLINIC | Age: 61
End: 2021-12-27
Payer: COMMERCIAL

## 2021-12-27 DIAGNOSIS — F41.9 ANXIETY AND DEPRESSION: Primary | ICD-10-CM

## 2021-12-27 DIAGNOSIS — F32.A ANXIETY AND DEPRESSION: Primary | ICD-10-CM

## 2021-12-27 PROCEDURE — 99214 OFFICE O/P EST MOD 30 MIN: CPT | Performed by: STUDENT IN AN ORGANIZED HEALTH CARE EDUCATION/TRAINING PROGRAM

## 2021-12-27 RX ORDER — SERTRALINE HYDROCHLORIDE 50 MG/1
50 TABLET, FILM COATED ORAL DAILY
Qty: 30 TABLET | Refills: 0 | Status: SHIPPED | OUTPATIENT
Start: 2021-12-27 | End: 2022-01-13 | Stop reason: SDUPTHER

## 2021-12-27 NOTE — PROGRESS NOTES
Fabiana May  62 y.o. female  1960  [place] [address]  [id number]   [address]:    Telemedicine Progress Note  Malorie Lopes DO       Encounter Date and Time: December 27, 2021 at 4:00 PM    Consent: Fabiana May, who was seen by synchronous (real-time) audio-video technology, and/or her healthcare decision maker, is aware that this patient-initiated, Telehealth encounter on 12/27/2021 is a billable service, with coverage as determined by her insurance carrier. She is aware that she may receive a bill and has provided verbal consent to proceed: Yes. Chief Complaint   Patient presents with    Anxiety     History of Present Illness   Fabiana May is a 62 y.o. female was evaluated by synchronous (real-time) audio-video technology from home, through a secure patient portal.    Patient presents very tearful. 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 He passed away in 10/2021 at 81years old. It was very sad for her but things got worse once her brothers got involved. They became very vocal about her fathers money and accused her of things that were untrue. Its been very hurtful for her that her brothers accused her that way especially since they were not involved in her father's care. She has developed severe anxiety and constantly ruminates on the conversations she had with her brothers. It causing her to cry. She tries to distract herself and is walking 3 miles. She also intends to go back to work in 1 week but she knows she needs to get help. She reports this happened to her before 36 years ago and she was treated with therapy and Xanax and notes it helped after 1 month. She has the support of her daughters and her .  She denies SI/HI    3 most recent PHQ Screens 12/27/2021   Little interest or pleasure in doing things Nearly every day Feeling down, depressed, irritable, or hopeless Nearly every day   Total Score PHQ 2 6   Trouble falling or staying asleep, or sleeping too much Nearly every day   Feeling tired or having little energy Several days   Poor appetite, weight loss, or overeating More than half the days   Feeling bad about yourself - or that you are a failure or have let yourself or your family down Nearly every day   Trouble concentrating on things such as school, work, reading, or watching TV Several days   Moving or speaking so slowly that other people could have noticed; or the opposite being so fidgety that others notice Several days   Thoughts of being better off dead, or hurting yourself in some way Not at all   PHQ 9 Score 17           Review of Systems   Review of Systems   Psychiatric/Behavioral: Positive for depression. Negative for suicidal ideas. The patient is nervous/anxious and has insomnia. Vitals/Objective:     General: alert, cooperative   Mental  status: Tearful, upset   Resp: resp: normal effort and no respiratory distress   Neuro: neuro: no gross deficits   Skin: skin: no discoloration or lesions of concern on visible areas   Due to this being a TeleHealth evaluation, many elements of the physical examination are unable to be assessed. Assessment and Plan:   62 y.o. F presenting with severe anxiety and depression. 1. Anxiety and depression  -Patient presents with severe anxiety and depression 2/2 familial issues after her father's death. Patient is tearful but very motivated to seek treatment. Has the support of her daughters and   - Will start on a SSRI today. Also recommended CBT. List of local therapy offices sent via email. If issues with getting into local offices can try psychology today. - Encouraged regular exercise and healthy diet  - Patient to follow up in 1-2 weeks to reassess after initiation of treatment  - sertraline (ZOLOFT) 50 mg tablet; Take 1 Tablet by mouth daily. Dispense: 30 Tablet; Refill: 0        Time spent in direct conversation with the patient to include medical condition(s) discussed, assessment and treatment plan:       We discussed the expected course, resolution and complications of the diagnosis(es) in detail. Medication risks, benefits, costs, interactions, and alternatives were discussed as indicated. I advised her to contact the office if her condition worsens, changes or fails to improve as anticipated. She expressed understanding with the diagnosis(es) and plan. Patient understands that this encounter was a temporary measure, and the importance of further follow up and examination was emphasized. Patient verbalized understanding. Patient informed to follow up: Radha Lyn Follow-up and Dispositions  ·   Return for 1-2 weeks for depression and anxiety follow up. Electronically Signed: Kay Farr DO    CPT Codes 77930-94376 for Established Patients may apply to this Telehealth Visit. POS code: 18. Modifier ANDREW Yoder is a 62 y.o. female who was evaluated by an audio-video encounter for concerns as above. Patient identification was verified prior to start of the visit. A caregiver was present when appropriate. Due to this being a TeleHealth encounter (During [facility]- public health emergency), evaluation of the following organ systems was limited: Vitals/Constitutional/EENT/Resp/CV/GI//MS/Neuro/Skin/Heme-Lymph-Imm. Pursuant to the emergency declaration under the [facility], 1135 waiver authority and the [employer] Act, this Virtual Visit was conducted, with patient's (and/or legal guardian's) consent, to reduce the patient's risk of exposure to COVID-19 and provide necessary medical care. Services were provided through a synchronous discussion virtually to substitute for in-person clinic visit. I was at home. The patient was at home. History   Patients past medical, surgical and family histories were reviewed and updated. Past Medical History:   Diagnosis Date    Breast cancer ([facility] 2010    LEFT breast     H/O bone density study 12/29/17 Normal    Mononucleosis 3/27/13    Radiation therapy complication 4404    left breast ca     Past Surgical History:   Procedure Laterality Date    COLONOSCOPY  6/11    normal, repaeat in 5 years    HX BREAST BIOPSY Left 2010    HX BREAST BIOPSY Left 2016    neg; stereotactic bx    HX BREAST LUMPECTOMY Left 2010    LEFT--Dr. Rodriguez Valentin    HX CHOLECYSTECTOMY  09/2008    HX DILATION AND CURETTAGE  6/5/14    with hysteroscopy for thickened endometrium    [place] BIOPSY OF BREAST, NEEDLE CORE  11/2010    infiltrating ductal carcinoma Dr Monteiro Graverlin     Family History   Problem Relation Age of Onset    Stomach Cancer Mother     Breast Cancer Other         Aunt    Breast Cancer Maternal Aunt 79     Social History     Tobacco Use    Smoking status: Passive Smoke Exposure - Never Smoker    Smokeless tobacco: Never Used   Substance Use Topics    Alcohol use: No     Comment: social smoker    Drug use: No     Patient Active Problem List   Diagnosis Code    Breast cancer ([facility] C50.919    S/P colonoscopy Z98.890    RBBB I45.10    H/O lumpectomy Z98.890          Current Medications/Allergies   Medications and Allergies reviewed:    Current Outpatient Medications   Medication Sig Dispense Refill    sertraline (ZOLOFT) 50 mg tablet Take 1 Tablet by mouth daily. 30 Tablet 0    ferrous sulfate (IRON PO) Take  by mouth.  CALCIUM PO Take  by mouth.  omega-3/dha/epa/fish oil (OMEGA-3 PO) Take  by mouth.  ascorbic acid, vitamin C, (VITAMIN C) 1,000 mg tablet Take  by mouth.  vitamin E acetate (VITAMIN E PO) Take  by mouth.  CYANOCOBALAMIN, VITAMIN B-12, PO Take  by mouth.        No Known Allergies

## 2021-12-27 NOTE — PROGRESS NOTES
[address] Medicine Residency Attending Addendum:  Dr. Kristina Wilder, DO,  the patient and I were not physically present during this encounter. The resident and I are concurrently monitoring the patient care through appropriate telecommunication technology. I discussed the findings, assessment and plan with the resident and agree with the resident's findings and plan as documented in the resident's note.       Guzman Mcclure MD

## 2022-01-03 ENCOUNTER — TELEPHONE (OUTPATIENT)
Dept: FAMILY MEDICINE CLINIC | Age: 62
End: 2022-01-03

## 2022-01-03 NOTE — TELEPHONE ENCOUNTER
Pt called and stated the zoloft is causing her to not eat or drink anything, and feeling worse.  She didn't say she needed a call back, just wanted to let the doctor know

## 2022-01-07 ENCOUNTER — TRANSCRIBE ORDER (OUTPATIENT)
Dept: SCHEDULING | Age: 62
End: 2022-01-07

## 2022-01-07 DIAGNOSIS — F41.9 ANXIETY AND DEPRESSION: Primary | ICD-10-CM

## 2022-01-07 DIAGNOSIS — F32.A ANXIETY AND DEPRESSION: Primary | ICD-10-CM

## 2022-01-07 DIAGNOSIS — Z12.31 SCREENING MAMMOGRAM FOR HIGH-RISK PATIENT: Primary | ICD-10-CM

## 2022-01-07 RX ORDER — HYDROXYZINE 50 MG/1
50 TABLET, FILM COATED ORAL
Qty: 30 TABLET | Refills: 1 | Status: SHIPPED | OUTPATIENT
Start: 2022-01-07 | End: 2022-03-17

## 2022-01-13 ENCOUNTER — VIRTUAL VISIT (OUTPATIENT)
Dept: FAMILY MEDICINE CLINIC | Age: 62
End: 2022-01-13
Payer: COMMERCIAL

## 2022-01-13 DIAGNOSIS — F32.A ANXIETY AND DEPRESSION: Primary | ICD-10-CM

## 2022-01-13 DIAGNOSIS — F41.9 ANXIETY AND DEPRESSION: ICD-10-CM

## 2022-01-13 DIAGNOSIS — F32.A ANXIETY AND DEPRESSION: ICD-10-CM

## 2022-01-13 DIAGNOSIS — F41.9 ANXIETY AND DEPRESSION: Primary | ICD-10-CM

## 2022-01-13 PROCEDURE — 99213 OFFICE O/P EST LOW 20 MIN: CPT | Performed by: STUDENT IN AN ORGANIZED HEALTH CARE EDUCATION/TRAINING PROGRAM

## 2022-01-13 NOTE — PROGRESS NOTES
[address] Medicine Residency Attending Addendum:  Dr. Yeyo Zacarias, DO,  the patient and I were not physically present during this encounter. The resident and I are concurrently monitoring the patient care through appropriate telecommunication technology. I discussed the findings, assessment and plan with the resident and agree with the resident's findings and plan as documented in the resident's note.       Vignesh Ruffin MD

## 2022-01-13 NOTE — PROGRESS NOTES
Alyx Van  62 y.o. female  1960  [place] [id number]  [id number]   [address]:    Telemedicine Progress Note  Janey Leon DO       Encounter Date and Time: January 13, 2022 at 9:15 AM    Consent: Alyx Van, who was seen by synchronous (real-time) audio-video technology, and/or her healthcare decision maker, is aware that this patient-initiated, Telehealth encounter on 1/13/2022 is a billable service, with coverage as determined by her insurance carrier. She is aware that she may receive a bill and has provided verbal consent to proceed: Yes. Chief Complaint   Patient presents with    Anxiety     History of Present Illness   Alyx Van is a 62 y.o. female was evaluated by synchronous (real-time) audio-video technology from home, through a secure patient portal.    Patient presents for follow up of anxiety and depression. Was started on Zoloft 50mg and Atarax prn at last visit. Patient reports initially she felt worse on the medication. She felt more depressed and anxious. Currently things are getting better. She notices a difference and is eating more. She still experiences anhedonia and at night she thinks a lot about what is going on in her life. She has seen a therapist and reports that is going well. She has plans to see her weekly. She is trying her best to get back into a routine and work if possible. Review of Systems   Review of Systems   Constitutional: Negative for chills, fever and weight loss. Eyes: Negative for blurred vision, double vision and photophobia. Respiratory: Negative for cough and shortness of breath. Cardiovascular: Negative for chest pain and palpitations. Gastrointestinal: Negative for abdominal pain, nausea and vomiting. Psychiatric/Behavioral: Positive for depression. Negative for suicidal ideas. The patient is nervous/anxious and has insomnia.         Vitals/Objective:     General: cooperative, no distress   Mental  status: mental status: alert, oriented to person, place, and time, affect appropriate to mood, anxious   Resp: resp: normal effort and no respiratory distress   Neuro: neuro: no gross deficits   Skin: skin: no discoloration or lesions of concern on visible areas   Due to this being a TeleHealth evaluation, many elements of the physical examination are unable to be assessed. Assessment and Plan:   62 y.o. F presenting for anxiety/depression follow up    1. Anxiety and depression  - Patient reports some improvement but not necessarily at goal. Will continue Zoloft at current dosing for now to allow for peak effect  - Encouraged patient to continue therapy as an adjunct to treatment  - Discussed ER/crisis precautions prn  - Patient to follow up in 2 weeks. Will consider titration of therapy during that time. Time spent in direct conversation with the patient to include medical condition(s) discussed, assessment and treatment plan:       We discussed the expected course, resolution and complications of the diagnosis(es) in detail. Medication risks, benefits, costs, interactions, and alternatives were discussed as indicated. I advised her to contact the office if her condition worsens, changes or fails to improve as anticipated. She expressed understanding with the diagnosis(es) and plan. Patient understands that this encounter was a temporary measure, and the importance of further follow up and examination was emphasized. Patient verbalized understanding. Patient informed to follow up: Elena Stands Follow-up and Dispositions  ·   Return in about 2 weeks (around 1/27/2022) for anxiety follow up. Electronically Signed: Delfina Cuellar DO    CPT Codes 18412-69979 for Established Patients may apply to this Telehealth Visit. POS code: 18. Modifier GT    Susa Kawasaki is a 62 y.o. female who was evaluated by an audio-video encounter for concerns as above.  Patient identification was verified prior to start of the visit. A caregiver was present when appropriate. Due to this being a TeleHealth encounter (During [facility] public health emergency), evaluation of the following organ systems was limited: Vitals/Constitutional/EENT/Resp/CV/GI//MS/Neuro/Skin/Heme-Lymph-Imm. Pursuant to the emergency declaration under the [facility] [place] [address] waiver authority and the [employer] Act, this Virtual Visit was conducted, with patient's (and/or legal guardian's) consent, to reduce the patient's risk of exposure to COVID-19 and provide necessary medical care. Services were provided through a synchronous discussion virtually to substitute for in-person clinic visit. I was at home. The patient was at home. History   Patients past medical, surgical and family histories were reviewed and updated. Past Medical History:   Diagnosis Date    Breast cancer ([facility] [address] 2010    LEFT breast     H/O bone density study 12/29/17 Normal    Mononucleosis 3/27/13    Radiation therapy complication 7303    left breast ca     Past Surgical History:   Procedure Laterality Date    COLONOSCOPY  6/11    normal, repaeat in 5 years    HX BREAST BIOPSY Left 2010    HX BREAST BIOPSY Left 2016    neg; stereotactic bx    HX BREAST LUMPECTOMY Left 2010    LEFT--Dr. Fabio Dailey    HX CHOLECYSTECTOMY  09/2008    HX DILATION AND CURETTAGE  6/5/14    with hysteroscopy for thickened endometrium    [place] BIOPSY OF BREAST, NEEDLE CORE  11/2010    infiltrating ductal carcinoma Dr Tomasa Sesay     Family History   Problem Relation Age of Onset    Stomach Cancer Mother     Breast Cancer Other         Aunt    Breast Cancer Maternal Aunt 79     Social History     Tobacco Use    Smoking status: Passive Smoke Exposure - Never Smoker    Smokeless tobacco: Never Used   Substance Use Topics    Alcohol use: No     Comment: social smoker    Drug use:  No Patient Active Problem List   Diagnosis Code    Breast cancer ([facility] C50.919    S/P colonoscopy Z98.890    RBBB I45.10    H/O lumpectomy Z98.890          Current Medications/Allergies   Medications and Allergies reviewed:    Current Outpatient Medications   Medication Sig Dispense Refill    hydrOXYzine HCL (ATARAX) 50 mg tablet Take 1 Tablet by mouth three (3) times daily as needed for Anxiety. 30 Tablet 1    sertraline (ZOLOFT) 50 mg tablet Take 1 Tablet by mouth daily. 30 Tablet 0    ferrous sulfate (IRON PO) Take  by mouth.  CALCIUM PO Take  by mouth.  omega-3/dha/epa/fish oil (OMEGA-3 PO) Take  by mouth.  ascorbic acid, vitamin C, (VITAMIN C) 1,000 mg tablet Take  by mouth.  vitamin E acetate (VITAMIN E PO) Take  by mouth.  CYANOCOBALAMIN, VITAMIN B-12, PO Take  by mouth.        No Known Allergies

## 2022-01-15 RX ORDER — SERTRALINE HYDROCHLORIDE 50 MG/1
50 TABLET, FILM COATED ORAL DAILY
Qty: 30 TABLET | Refills: 0 | Status: SHIPPED | OUTPATIENT
Start: 2022-01-15 | End: 2022-01-31 | Stop reason: SDUPTHER

## 2022-01-31 ENCOUNTER — VIRTUAL VISIT (OUTPATIENT)
Dept: FAMILY MEDICINE CLINIC | Age: 62
End: 2022-01-31
Payer: COMMERCIAL

## 2022-01-31 DIAGNOSIS — F32.A ANXIETY AND DEPRESSION: Primary | ICD-10-CM

## 2022-01-31 DIAGNOSIS — F41.9 ANXIETY AND DEPRESSION: Primary | ICD-10-CM

## 2022-01-31 PROCEDURE — 99213 OFFICE O/P EST LOW 20 MIN: CPT | Performed by: STUDENT IN AN ORGANIZED HEALTH CARE EDUCATION/TRAINING PROGRAM

## 2022-01-31 RX ORDER — SERTRALINE HYDROCHLORIDE 50 MG/1
50 TABLET, FILM COATED ORAL DAILY
Qty: 30 TABLET | Refills: 0 | Status: SHIPPED | OUTPATIENT
Start: 2022-02-07 | End: 2022-03-03 | Stop reason: SDUPTHER

## 2022-01-31 NOTE — PROGRESS NOTES
Huel Schwab  62 y.o. female  1960  [facility] [id number]  [id number]   [address]:    Telemedicine Progress Note  Julio C Almanza DO       Encounter Date and Time: January 31, 2022 at 3:45 PM    Consent: Huel Schwab, who was seen by synchronous (real-time) audio-video technology, and/or her healthcare decision maker, is aware that this patient-initiated, Telehealth encounter on 1/31/2022 is a billable service, with coverage as determined by her insurance carrier. She is aware that she may receive a bill and has provided verbal consent to proceed: Yes. Chief Complaint   Patient presents with    Anxiety    Follow-up     History of Present Illness   Huel Schwab is a 62 y.o. female was evaluated by synchronous (real-time) audio-video technology from home, through a secure patient portal.    Patient presents for follow up of anxiety and depression. Is currently on Zoloft 50mg. Is doing better on the medication. Started working 2 weeks ago and feels back to baseline. Appetite is back. Her  also notes an improvement as she has been making jokes and being sarcastic. Saw the therapist twice but feels that she doesn't need her anymore because she is back to her baseline. Has another visit scheduled soon. Reports that if another episode happens like this again would like to be treated with Xanax because of the quicker onset of action however notes that the Zoloft has helped. Review of Systems   Review of Systems   Psychiatric/Behavioral: Negative for depression, hallucinations, substance abuse and suicidal ideas. The patient is not nervous/anxious.         Vitals/Objective:     General: alert, cooperative, no distress   Mental  status: mental status: alert, oriented to person, place, and time, normal mood, behavior, speech, dress, motor activity, and thought processes   Resp: resp: normal effort and no respiratory distress   Neuro: neuro: no gross deficits   Skin: skin: no discoloration or lesions of concern on visible areas   Due to this being a TeleHealth evaluation, many elements of the physical examination are unable to be assessed. Assessment and Plan:   62 y.o. F presenting for anxiety follow up. 1. Anxiety and depression  - Improved. Patient reports feeling at her baseline and is now back to work. Will continue medication at current dose for now  - Patient to follow up in 4 weeks. She would like to consider weaning off during that time if indicated  - sertraline (ZOLOFT) 50 mg tablet; Take 1 Tablet by mouth daily. Dispense: 30 Tablet; Refill: 0        Time spent in direct conversation with the patient to include medical condition(s) discussed, assessment and treatment plan:       We discussed the expected course, resolution and complications of the diagnosis(es) in detail. Medication risks, benefits, costs, interactions, and alternatives were discussed as indicated. I advised her to contact the office if her condition worsens, changes or fails to improve as anticipated. She expressed understanding with the diagnosis(es) and plan. Patient understands that this encounter was a temporary measure, and the importance of further follow up and examination was emphasized. Patient verbalized understanding. Patient informed to follow up: Sandee Orozco Follow-up and Dispositions  ·   Return in about 4 weeks (around 2/28/2022) for anxiety follow up. Electronically Signed: Radha Culp DO    CPT Codes 22849-37152 for Established Patients may apply to this Telehealth Visit. POS code: 18. Modifier GT    Kayode Adames is a 62 y.o. female who was evaluated by an audio-video encounter for concerns as above. Patient identification was verified prior to start of the visit. A caregiver was present when appropriate.  Due to this being a TeleHealth encounter (During [facility]-93 public health emergency), evaluation of the following organ systems was limited: Vitals/Constitutional/EENT/Resp/CV/GI//MS/Neuro/Skin/Heme-Lymph-Imm. Pursuant to the emergency declaration under the [facility], [facility] waiver authority and the [employer] Act, this Virtual Visit was conducted, with patient's (and/or legal guardian's) consent, to reduce the patient's risk of exposure to COVID-19 and provide necessary medical care. Services were provided through a synchronous discussion virtually to substitute for in-person clinic visit. I was at home. The patient was at home. History   Patients past medical, surgical and family histories were reviewed and updated.       Past Medical History:   Diagnosis Date    Breast cancer ([facility] 2010    LEFT breast     H/O bone density study 12/29/17 Normal    Mononucleosis 3/27/13    Radiation therapy complication 6340    left breast ca     Past Surgical History:   Procedure Laterality Date    COLONOSCOPY  6/11    normal, repaeat in 5 years    HX BREAST BIOPSY Left 2010    HX BREAST BIOPSY Left 2016    neg; stereotactic bx    HX BREAST LUMPECTOMY Left 2010    LEFT--Dr. Azul Batch    HX CHOLECYSTECTOMY  09/2008    HX DILATION AND CURETTAGE  6/5/14    with hysteroscopy for thickened endometrium    [place] BIOPSY OF BREAST, NEEDLE CORE  11/2010    infiltrating ductal carcinoma Dr Ortiz Campos     Family History   Problem Relation Age of Onset    Stomach Cancer Mother     Breast Cancer Other         Aunt    Breast Cancer Maternal Aunt 79     Social History     Tobacco Use    Smoking status: Passive Smoke Exposure - Never Smoker    Smokeless tobacco: Never Used   Substance Use Topics    Alcohol use: No     Comment: social smoker    Drug use: No     Patient Active Problem List   Diagnosis Code    Breast cancer ([facility] C50.919    S/P colonoscopy Z98.890    RBBB I45.10    H/O lumpectomy Z98.890          Current Medications/Allergies   Medications and Allergies reviewed:    Current Outpatient Medications   Medication Sig Dispense Refill    [START ON 2/7/2022] sertraline (ZOLOFT) 50 mg tablet Take 1 Tablet by mouth daily. 30 Tablet 0    hydrOXYzine HCL (ATARAX) 50 mg tablet Take 1 Tablet by mouth three (3) times daily as needed for Anxiety. 30 Tablet 1    ferrous sulfate (IRON PO) Take  by mouth.  CALCIUM PO Take  by mouth.  omega-3/dha/epa/fish oil (OMEGA-3 PO) Take  by mouth.  ascorbic acid, vitamin C, (VITAMIN C) 1,000 mg tablet Take  by mouth.  vitamin E acetate (VITAMIN E PO) Take  by mouth.  CYANOCOBALAMIN, VITAMIN B-12, PO Take  by mouth.        No Known Allergies

## 2022-02-01 ENCOUNTER — TELEPHONE (OUTPATIENT)
Dept: FAMILY MEDICINE CLINIC | Age: 62
End: 2022-02-01

## 2022-02-01 NOTE — TELEPHONE ENCOUNTER
Received in-basket from : This patient will need follow up with me in 4 weeks. Thanks     PJC     I called pt to schedule, but no answer. Left vm.

## 2022-03-03 ENCOUNTER — OFFICE VISIT (OUTPATIENT)
Dept: FAMILY MEDICINE CLINIC | Age: 62
End: 2022-03-03
Payer: COMMERCIAL

## 2022-03-03 VITALS
WEIGHT: 177 LBS | SYSTOLIC BLOOD PRESSURE: 116 MMHG | BODY MASS INDEX: 31.36 KG/M2 | DIASTOLIC BLOOD PRESSURE: 73 MMHG | RESPIRATION RATE: 18 BRPM | TEMPERATURE: 98.8 F | OXYGEN SATURATION: 98 % | HEIGHT: 63 IN | HEART RATE: 67 BPM

## 2022-03-03 DIAGNOSIS — F32.A ANXIETY AND DEPRESSION: ICD-10-CM

## 2022-03-03 DIAGNOSIS — F41.9 ANXIETY AND DEPRESSION: ICD-10-CM

## 2022-03-03 PROCEDURE — 99213 OFFICE O/P EST LOW 20 MIN: CPT | Performed by: STUDENT IN AN ORGANIZED HEALTH CARE EDUCATION/TRAINING PROGRAM

## 2022-03-03 RX ORDER — SERTRALINE HYDROCHLORIDE 50 MG/1
50 TABLET, FILM COATED ORAL DAILY
Qty: 90 TABLET | Refills: 1 | Status: SHIPPED | OUTPATIENT
Start: 2022-03-03 | End: 2022-09-13 | Stop reason: SDUPTHER

## 2022-03-03 NOTE — PROGRESS NOTES
Identified Patient with two Patient identifiers (Name and ). Two Patient Identifiers confirmed. Reviewed record in preparation for visit and have obtained necessary documentation. No chief complaint on file. Visit Vitals  /73 (BP 1 Location: Right arm, BP Patient Position: Sitting, BP Cuff Size: Adult)   Pulse 67   Temp 98.8 °F (37.1 °C) (Oral)   Resp 18   Ht 5' 3\" (1.6 m)   Wt 177 lb (80.3 kg)   SpO2 98%   BMI 31.35 kg/m²       1. Have you been to the ER, urgent care clinic since your last visit? Hospitalized since your last visit? No    2. Have you seen or consulted any other health care providers outside of the [address] [place] [address] since your last visit? Include any pap smears or colon screening.  No

## 2022-03-03 NOTE — PROGRESS NOTES
Chris Koroma  62 y.o. female  1960  [id number]  [facility]  Progress Note     Encounter Date: 3/3/2022    Assessment and Plan:     Encounter Diagnoses     ICD-10-CM ICD-9-CM   1. Anxiety and depression  F41.9 300.00    F32. A 311       1. Anxiety and depression  - Stable. After discussion will continue medication at current dose. Patient encouraged to take medication in the AM instead of PM to aid in insomnia (possible stimulation from SSRI)  - Will follow up in 4-6 weeks. Can consider CBT-I during that time if insomnia does not improve  - sertraline (ZOLOFT) 50 mg tablet; Take 1 Tablet by mouth daily. Dispense: 90 Tablet; Refill: 1      I have discussed the diagnosis with the patient and the intended plan as seen in the above orders. she has expressed understanding. The patient has received an after-visit summary and questions were answered concerning future plans. I have discussed medication side effects and warnings with the patient as well. Electronically Signed: Lenin Metcalf, DO    Current Medications after this visit     Current Outpatient Medications   Medication Sig    ZINC PO Take  by mouth.  sertraline (ZOLOFT) 50 mg tablet Take 1 Tablet by mouth daily.  hydrOXYzine HCL (ATARAX) 50 mg tablet Take 1 Tablet by mouth three (3) times daily as needed for Anxiety.  ferrous sulfate (IRON PO) Take  by mouth.  omega-3/dha/epa/fish oil (OMEGA-3 PO) Take  by mouth.  ascorbic acid, vitamin C, (VITAMIN C) 1,000 mg tablet Take  by mouth.  vitamin E acetate (VITAMIN E PO) Take  by mouth.  CYANOCOBALAMIN, VITAMIN B-12, PO Take  by mouth. No current facility-administered medications for this visit. Medications Discontinued During This Encounter   Medication Reason    CALCIUM PO Therapy Completed    sertraline (ZOLOFT) 50 mg tablet REORDER     ~~~~~~~~~~~~~~~~~~~~~~~~~~~~~~~~~~~~~~~~~~~~~~~~~~~~~~~~~~~    No chief complaint on file.       History provided by patient  History of Present Illness   Allegra Marion is a 62 y.o. female who presents to clinic today for:  No chief complaint on file. Patient presents for follow up of depression and anxiety. Is currently on Zoloft 50mg. Reports overall things are ok but having some moments of anxiety and difficulty with deep sleep at night. Also report some familial strain with her son currently that caused some stress. Is still exercising and going to work. Also sees her therapist. After some thought would like to continue the medication for now. She feels good at the current does and does not want to change anything  PHQ9=1  NEREIDA=8    Health Maintenance    Health Maintenance Due   Topic Date Due    Shingrix Vaccine Age 49> (1 of 2) Never done    Colorectal Cancer Screening Combo  05/26/2021    Flu Vaccine (1) 09/01/2021    Breast Cancer Screen Mammogram  02/25/2022     Review of Systems   Review of Systems   Constitutional: Negative for chills and fever. Cardiovascular: Negative for chest pain and palpitations. Gastrointestinal: Negative for abdominal pain, nausea and vomiting. Psychiatric/Behavioral: Negative for depression and suicidal ideas. The patient is nervous/anxious and has insomnia. Vitals/Objective:     Vitals:    03/03/22 1545   BP: 116/73   Pulse: 67   Resp: 18   Temp: 98.8 °F (37.1 °C)   TempSrc: Oral   SpO2: 98%   Weight: 177 lb (80.3 kg)   Height: 5' 3\" (1.6 m)     Body mass index is 31.35 kg/m². Wt Readings from Last 3 Encounters:   03/03/22 177 lb (80.3 kg)   05/25/21 186 lb (84.4 kg)   10/26/20 185 lb (83.9 kg)         Objective  Physical Exam  Constitutional:       General: She is not in acute distress. HENT:      Head: Normocephalic and atraumatic. Pulmonary:      Effort: Pulmonary effort is normal.   Neurological:      General: No focal deficit present. Mental Status: She is oriented to person, place, and time.           No results found for this or any previous visit (from the past 24 hour(s)). Disposition     Future Appointments   Date Time Provider Matilde Villa   4/5/2022 10:45 AM [facility]       History   Patient's past medical, surgical and family histories were reviewed and updated.     Past Medical History:   Diagnosis Date    Breast cancer ([address] 2010    LEFT breast     H/O bone density study 12/29/17 Normal    Mononucleosis 3/27/13    Radiation therapy complication 2482    left breast ca     Past Surgical History:   Procedure Laterality Date    COLONOSCOPY  6/11    normal, repaeat in 5 years    HX BREAST BIOPSY Left 2010    HX BREAST BIOPSY Left 2016    neg; stereotactic bx    HX BREAST LUMPECTOMY Left 2010    LEFT--Dr. Audie Johnson    HX CHOLECYSTECTOMY  09/2008    HX DILATION AND CURETTAGE  6/5/14    with hysteroscopy for thickened endometrium    [place] BIOPSY OF BREAST, NEEDLE CORE  11/2010    infiltrating ductal carcinoma Dr Carolyn Rodrigues     Family History   Problem Relation Age of Onset    Stomach Cancer Mother     Breast Cancer Other         Aunt    Breast Cancer Maternal Aunt 79     Social History     Tobacco Use    Smoking status: Passive Smoke Exposure - Never Smoker    Smokeless tobacco: Never Used   Substance Use Topics    Alcohol use: No     Comment: social smoker    Drug use: No       Allergies   No Known Allergies

## 2022-03-16 DIAGNOSIS — F32.A ANXIETY AND DEPRESSION: ICD-10-CM

## 2022-03-16 DIAGNOSIS — F41.9 ANXIETY AND DEPRESSION: ICD-10-CM

## 2022-03-17 RX ORDER — HYDROXYZINE 50 MG/1
50 TABLET, FILM COATED ORAL
Qty: 30 TABLET | Refills: 1 | Status: SHIPPED | OUTPATIENT
Start: 2022-03-17

## 2022-03-19 PROBLEM — Z98.890 H/O LUMPECTOMY: Status: ACTIVE | Noted: 2019-10-11

## 2022-04-05 ENCOUNTER — HOSPITAL ENCOUNTER (OUTPATIENT)
Dept: MAMMOGRAPHY | Age: 62
Discharge: HOME | End: 2022-04-05
Attending: SURGERY
Payer: COMMERCIAL

## 2022-04-05 DIAGNOSIS — Z12.31 SCREENING MAMMOGRAM FOR HIGH-RISK PATIENT: ICD-10-CM

## 2022-04-05 PROCEDURE — 77063 BREAST TOMOSYNTHESIS BI: CPT

## 2022-09-13 DIAGNOSIS — F41.9 ANXIETY AND DEPRESSION: ICD-10-CM

## 2022-09-13 DIAGNOSIS — F32.A ANXIETY AND DEPRESSION: ICD-10-CM

## 2022-09-13 RX ORDER — SERTRALINE HYDROCHLORIDE 50 MG/1
50 TABLET, FILM COATED ORAL DAILY
Qty: 90 TABLET | Refills: 1 | Status: SHIPPED | OUTPATIENT
Start: 2022-09-13

## 2023-02-03 ENCOUNTER — TRANSCRIBE ORDER (OUTPATIENT)
Dept: SCHEDULING | Age: 63
End: 2023-02-03

## 2023-02-03 DIAGNOSIS — Z12.31 SCREENING MAMMOGRAM FOR HIGH-RISK PATIENT: Primary | ICD-10-CM

## 2023-04-06 ENCOUNTER — HOSPITAL ENCOUNTER (OUTPATIENT)
Dept: MAMMOGRAPHY | Age: 63
End: 2023-04-06
Attending: SURGERY
Payer: COMMERCIAL

## 2023-04-06 PROCEDURE — 77067 SCR MAMMO BI INCL CAD: CPT

## 2023-04-06 PROCEDURE — 77063 BREAST TOMOSYNTHESIS BI: CPT

## 2023-04-22 DIAGNOSIS — Z12.31 SCREENING MAMMOGRAM FOR HIGH-RISK PATIENT: Primary | ICD-10-CM

## 2024-03-14 ENCOUNTER — OFFICE VISIT (OUTPATIENT)
Age: 64
End: 2024-03-14
Payer: COMMERCIAL

## 2024-03-14 VITALS
HEIGHT: 63 IN | BODY MASS INDEX: 32.82 KG/M2 | WEIGHT: 185.2 LBS | DIASTOLIC BLOOD PRESSURE: 80 MMHG | SYSTOLIC BLOOD PRESSURE: 122 MMHG

## 2024-03-14 DIAGNOSIS — Z01.419 ENCOUNTER FOR ANNUAL ROUTINE GYNECOLOGICAL EXAMINATION: Primary | ICD-10-CM

## 2024-03-14 DIAGNOSIS — Z12.4 ENCOUNTER FOR PAPANICOLAOU SMEAR FOR CERVICAL CANCER SCREENING: ICD-10-CM

## 2024-03-14 PROCEDURE — 99396 PREV VISIT EST AGE 40-64: CPT | Performed by: OBSTETRICS & GYNECOLOGY

## 2024-03-14 SDOH — ECONOMIC STABILITY: FOOD INSECURITY: WITHIN THE PAST 12 MONTHS, YOU WORRIED THAT YOUR FOOD WOULD RUN OUT BEFORE YOU GOT MONEY TO BUY MORE.: NEVER TRUE

## 2024-03-14 SDOH — ECONOMIC STABILITY: INCOME INSECURITY: HOW HARD IS IT FOR YOU TO PAY FOR THE VERY BASICS LIKE FOOD, HOUSING, MEDICAL CARE, AND HEATING?: NOT HARD AT ALL

## 2024-03-14 SDOH — ECONOMIC STABILITY: FOOD INSECURITY: WITHIN THE PAST 12 MONTHS, THE FOOD YOU BOUGHT JUST DIDN'T LAST AND YOU DIDN'T HAVE MONEY TO GET MORE.: NEVER TRUE

## 2024-03-14 SDOH — ECONOMIC STABILITY: HOUSING INSECURITY
IN THE LAST 12 MONTHS, WAS THERE A TIME WHEN YOU DID NOT HAVE A STEADY PLACE TO SLEEP OR SLEPT IN A SHELTER (INCLUDING NOW)?: NO

## 2024-03-14 ASSESSMENT — PATIENT HEALTH QUESTIONNAIRE - PHQ9
1. LITTLE INTEREST OR PLEASURE IN DOING THINGS: 0
SUM OF ALL RESPONSES TO PHQ9 QUESTIONS 1 & 2: 0
SUM OF ALL RESPONSES TO PHQ QUESTIONS 1-9: 0
2. FEELING DOWN, DEPRESSED OR HOPELESS: 0
SUM OF ALL RESPONSES TO PHQ QUESTIONS 1-9: 0

## 2024-03-20 LAB
CYTOLOGIST CVX/VAG CYTO: NORMAL
CYTOLOGY CVX/VAG DOC CYTO: NORMAL
CYTOLOGY CVX/VAG DOC THIN PREP: NORMAL
DX ICD CODE: NORMAL
HPV GENOTYPE REFLEX: NORMAL
HPV I/H RISK 4 DNA CVX QL PROBE+SIG AMP: NEGATIVE
Lab: NORMAL
OTHER STN SPEC: NORMAL
STAT OF ADQ CVX/VAG CYTO-IMP: NORMAL

## 2024-05-10 ENCOUNTER — HOSPITAL ENCOUNTER (OUTPATIENT)
Facility: HOSPITAL | Age: 64
Discharge: HOME | End: 2024-05-10
Attending: SURGERY
Payer: COMMERCIAL

## 2024-05-10 DIAGNOSIS — Z12.31 VISIT FOR SCREENING MAMMOGRAM: ICD-10-CM

## 2024-05-10 PROCEDURE — 77063 BREAST TOMOSYNTHESIS BI: CPT

## 2024-07-22 NOTE — PROGRESS NOTES
Annual exam post menopause      Lina Nair is a No obstetric history on file.,  63 y.o. female   No LMP recorded. (Menstrual status: Menopause).    She presents for her annual checkup.     She is having no problems.  Breast cancer in 2010.  Clear since.    Menstrual status:  The patient is not having menses.    Hormonal status:  She reports no perimenstrual type symptoms.   She is not having vasomotor symptoms.  The patient is not using any HRT.     Sexual history:    She  reports being sexually active and has had partner(s) who are male.    Per Nursing Note:  Last Pap: normal obtained 5 year(s) ago.  She does not have a history of DORIE 2, 3 or cervical cancer.   Last Mammogram:  mammogram schedule for next month  Last colonoscopy: normal obtained 3 year(s) ago.    Past Medical History:   Diagnosis Date    Breast cancer ([facility]) 2010    LEFT breast     H/O bone density study 12/29/17 Normal    Mononucleosis 3/27/13    Radiation therapy complication 2010    left breast ca     Past Surgical History:   Procedure Laterality Date    BIOPSY OF BREAST, NEEDLE CORE  11/2010    infiltrating ductal carcinoma Dr Meg Dimas    BREAST BIOPSY Left 2010    BREAST BIOPSY Left 2016    neg; stereotactic bx    BREAST LUMPECTOMY Left 2010    LEFT--Dr. Dimas    CHOLECYSTECTOMY  09/2008    COLONOSCOPY  6/11    normal, repaeat in 5 years    DILATION AND CURETTAGE OF UTERUS  6/5/14    with hysteroscopy for thickened endometrium       Current Outpatient Medications   Medication Sig Dispense Refill    Multiple Vitamin (MULTI VITAMIN PO) 1 tablet by mouth as directed Oral for 0      ZINC PO Take by mouth      ascorbic acid (VITAMIN C) 1000 MG tablet Take by mouth      sertraline (ZOLOFT) 50 MG tablet TAKE 1 TABLET BY MOUTH EVERY DAY      hydrOXYzine HCl (ATARAX) 50 MG tablet Take 50 mg by mouth 3 times daily as needed       No current facility-administered medications for this visit.     Allergies: Patient has no known allergies. 
Lina Nair is a 63 y.o. female returns for an annual exam     Chief Complaint   Patient presents with    Annual Exam       No LMP recorded.  Her periods are absent in flow   Problems: no problems  Birth Control: post menopausal status.  Last Pap: normal obtained 5 year(s) ago.  She does not have a history of DORIE 2, 3 or cervical cancer.   Last Mammogram:  mammogram schedule for next month  .   Last colonoscopy: normal obtained 3 year(s) ago.      1. Have you been to the ER, urgent care clinic, or hospitalized since your last visit? No    2. Have you seen or consulted any other health care providers outside of the [facility] since your last visit? No    Examination chaperoned by Jonnathan Villavicencio LPN.  
108

## 2025-03-18 ENCOUNTER — OFFICE VISIT (OUTPATIENT)
Age: 65
End: 2025-03-18
Payer: COMMERCIAL

## 2025-03-18 VITALS — WEIGHT: 185 LBS | HEIGHT: 63 IN | BODY MASS INDEX: 32.78 KG/M2

## 2025-03-18 DIAGNOSIS — R22.31 AXILLARY MASS, RIGHT: Primary | ICD-10-CM

## 2025-03-18 PROBLEM — Z98.890 H/O LUMPECTOMY: Status: RESOLVED | Noted: 2019-10-11 | Resolved: 2025-03-18

## 2025-03-18 PROCEDURE — 99202 OFFICE O/P NEW SF 15 MIN: CPT | Performed by: SURGERY

## 2025-03-18 PROCEDURE — 76642 ULTRASOUND BREAST LIMITED: CPT | Performed by: SURGERY

## 2025-03-18 NOTE — PROGRESS NOTES
HISTORY OF PRESENT ILLNESS  Lina Nair is a 64 y.o. female     HPI NEW former patient consult referred by  Dr. Molina for RIGHT axilla lump. She noticed it 10 days ago, but cannot feel it today.  Here with her .    11/2010 Left Breast Lumpectomy  stage 1, grade 1, 1cm IDC, SN-, %, %, HER2-  Partial breast radiation, Tamoxifen  BRCA negative    Family History:  No family hx of breast or ovarian cancer      Mammogram Result (most recent):  [facility]I DIGITAL SCREEN BILATERAL 05/10/2024    Narrative  STUDY: Bilateral digital screening mammogram with 3-D tomosynthesis    INDICATION:  Screening. History of left breast cancer, status post lumpectomy in  2010.    COMPARISON: [facility] dating back to 2020    BREAST COMPOSITION: The breasts are heterogeneously dense, which may obscure  small masses.    FINDINGS: Bilateral digital screening mammography was performed and is  interpreted in conjunction with a computer assisted detection (CAD) system.  Additionally, tomosynthesis of both breasts in the CC and MLO projections was  performed. No suspicious masses or calcifications are identified. Lumpectomy  scar in the left breast demonstrates no significant change. A biopsy clip is  again seen in the left breast.    Impression  BI-RADS 2: Benign. No mammographic evidence of malignancy. No significant change  in left breast lumpectomy scar.    RECOMMENDATIONS:  Next screening mammogram is recommended in one year.              Review of Systems      Physical Exam  Chest:   Breasts:     Right: No swelling, mass, skin change or tenderness.      Left: Mass (dsense at lumpectomy scar.  pt had partial breast radiation) present. No swelling, skin change or tenderness.          Comments: No mass in the RIGHT axilla  Lymphadenopathy:      Upper Body:      Right upper body: No axillary adenopathy.      Left upper body: No axillary adenopathy.        BREAST ULTRASOUND  Indication: RIGHT axillary mass  Technique:  The RIGHT breast

## 2025-04-17 ENCOUNTER — TRANSCRIBE ORDERS (OUTPATIENT)
Facility: HOSPITAL | Age: 65
End: 2025-04-17

## 2025-04-17 DIAGNOSIS — Z12.31 VISIT FOR SCREENING MAMMOGRAM: Primary | ICD-10-CM

## 2025-04-28 NOTE — PROGRESS NOTES
Lina Nair is a 64 y.o. female returns for an annual exam     No LMP recorded. (Menstrual status: Menopause).  Problems: no problems  Birth Control: post menopausal status.  Last Pap: normal obtained 1 year(s) ago.  She does not have a history of DORIE 2, 3 or cervical cancer.   Last Mammogram: has not had a recent mammogram.  It was normal 2024.   Last Bone Density: normal obtained 7 year(s) ago.  Last colonoscopy: normal obtained 4 year(s) ago.      1. Have you been to the ER, urgent care clinic, or hospitalized since your last visit? No    2. Have you seen or consulted any other health care providers outside of the [facility] since your last visit? No    Examination chaperoned by Ally Pryor LPN.

## 2025-04-30 ENCOUNTER — OFFICE VISIT (OUTPATIENT)
Age: 65
End: 2025-04-30
Payer: COMMERCIAL

## 2025-04-30 VITALS
BODY MASS INDEX: 33.52 KG/M2 | DIASTOLIC BLOOD PRESSURE: 74 MMHG | WEIGHT: 189.2 LBS | HEART RATE: 76 BPM | SYSTOLIC BLOOD PRESSURE: 124 MMHG | OXYGEN SATURATION: 94 % | RESPIRATION RATE: 18 BRPM | TEMPERATURE: 98.3 F | HEIGHT: 63 IN

## 2025-04-30 DIAGNOSIS — Z01.419 ENCOUNTER FOR ANNUAL ROUTINE GYNECOLOGICAL EXAMINATION: Primary | ICD-10-CM

## 2025-04-30 PROCEDURE — 99396 PREV VISIT EST AGE 40-64: CPT | Performed by: OBSTETRICS & GYNECOLOGY

## 2025-04-30 SDOH — ECONOMIC STABILITY: FOOD INSECURITY: WITHIN THE PAST 12 MONTHS, THE FOOD YOU BOUGHT JUST DIDN'T LAST AND YOU DIDN'T HAVE MONEY TO GET MORE.: NEVER TRUE

## 2025-04-30 SDOH — ECONOMIC STABILITY: FOOD INSECURITY: WITHIN THE PAST 12 MONTHS, YOU WORRIED THAT YOUR FOOD WOULD RUN OUT BEFORE YOU GOT MONEY TO BUY MORE.: NEVER TRUE

## 2025-04-30 ASSESSMENT — PATIENT HEALTH QUESTIONNAIRE - PHQ9
SUM OF ALL RESPONSES TO PHQ QUESTIONS 1-9: 0
2. FEELING DOWN, DEPRESSED OR HOPELESS: NOT AT ALL
SUM OF ALL RESPONSES TO PHQ QUESTIONS 1-9: 0
1. LITTLE INTEREST OR PLEASURE IN DOING THINGS: NOT AT ALL
SUM OF ALL RESPONSES TO PHQ QUESTIONS 1-9: 0
SUM OF ALL RESPONSES TO PHQ QUESTIONS 1-9: 0

## 2025-04-30 NOTE — PROGRESS NOTES
Annual exam post menopause      Lina Nair is a ,  64 y.o. female   No LMP recorded. (Menstrual status: Menopause).    She presents for her annual checkup.     She is having no problems.    Menstrual status:  The patient is not having menses.    Hormonal status:  She reports no perimenstrual type symptoms.   She is not having vasomotor symptoms.  The patient is not using any HRT.     Sexual history:    She  reports being sexually active and has had partner(s) who are male.    Per Nursing Note:  Last Pap: normal obtained 1 year(s) ago.  She does not have a history of DORIE 2, 3 or cervical cancer.   Last Mammogram: has not had a recent mammogram.  It was normal .   Last Bone Density: normal obtained 7 year(s) ago.  Last colonoscopy: normal obtained 4 year(s) ago.    Past Medical History:   Diagnosis Date    Breast cancer (HCC)     LEFT breast     H/O bone density study 17 Normal    Mononucleosis 3/27/13    Radiation therapy complication 2010    left breast ca     Past Surgical History:   Procedure Laterality Date    BIOPSY OF BREAST, NEEDLE CORE  2010    infiltrating ductal carcinoma Dr Meg Dimas    BREAST BIOPSY Left 2010    BREAST BIOPSY Left 2016    neg; stereotactic bx    BREAST LUMPECTOMY Left     LEFT--Dr. Dimas    CHOLECYSTECTOMY  2008    COLONOSCOPY      normal, repaeat in 5 years    DILATION AND CURETTAGE OF UTERUS  14    with hysteroscopy for thickened endometrium       Current Outpatient Medications   Medication Sig Dispense Refill    Multiple Vitamin (MULTI VITAMIN PO) 1 tablet by mouth as directed Oral for 0      ZINC PO Take by mouth      sertraline (ZOLOFT) 50 MG tablet TAKE 1 TABLET BY MOUTH EVERY DAY      ascorbic acid (VITAMIN C) 1000 MG tablet Take by mouth (Patient not taking: Reported on 2025)       No current facility-administered medications for this visit.     Allergies: Patient has no known allergies.     Tobacco History:  reports that she

## 2025-05-21 ENCOUNTER — HOSPITAL ENCOUNTER (OUTPATIENT)
Facility: HOSPITAL | Age: 65
Discharge: HOME | End: 2025-05-24
Attending: SURGERY
Payer: COMMERCIAL

## 2025-05-21 VITALS — BODY MASS INDEX: 33.52 KG/M2 | HEIGHT: 63 IN | WEIGHT: 189.2 LBS

## 2025-05-21 DIAGNOSIS — Z12.31 VISIT FOR SCREENING MAMMOGRAM: ICD-10-CM

## 2025-05-21 PROCEDURE — 77063 BREAST TOMOSYNTHESIS BI: CPT

## 2025-07-19 ENCOUNTER — APPOINTMENT (OUTPATIENT)
Dept: VASCULAR SURGERY | Facility: HOSPITAL | Age: 65
End: 2025-07-19
Attending: EMERGENCY MEDICINE
Payer: COMMERCIAL

## 2025-07-19 ENCOUNTER — HOSPITAL ENCOUNTER (EMERGENCY)
Facility: HOSPITAL | Age: 65
Discharge: HOME | End: 2025-07-19
Attending: EMERGENCY MEDICINE
Payer: COMMERCIAL

## 2025-07-19 VITALS
BODY MASS INDEX: 34.14 KG/M2 | HEIGHT: 63 IN | WEIGHT: 192.68 LBS | DIASTOLIC BLOOD PRESSURE: 90 MMHG | OXYGEN SATURATION: 94 % | RESPIRATION RATE: 16 BRPM | HEART RATE: 64 BPM | SYSTOLIC BLOOD PRESSURE: 140 MMHG | TEMPERATURE: 97.7 F

## 2025-07-19 DIAGNOSIS — M71.22 SYNOVIAL CYST OF LEFT POPLITEAL SPACE: Primary | ICD-10-CM

## 2025-07-19 PROCEDURE — 93971 EXTREMITY STUDY: CPT

## 2025-07-19 PROCEDURE — 99284 EMERGENCY DEPT VISIT MOD MDM: CPT

## 2025-07-19 ASSESSMENT — LIFESTYLE VARIABLES
HOW OFTEN DO YOU HAVE A DRINK CONTAINING ALCOHOL: NEVER
HOW MANY STANDARD DRINKS CONTAINING ALCOHOL DO YOU HAVE ON A TYPICAL DAY: PATIENT DOES NOT DRINK

## 2025-07-19 NOTE — ED TRIAGE NOTES
Pt arrives ambulatory to the ED with complaints of L knee pain that started today, pt reports the pain is behind her knee and increases when she bears weight on the leg. Pt was seen at [facility] who referred her to the ED to rule out blood clots or a Baker's cyst.

## 2025-07-19 NOTE — ED PROVIDER NOTES
[facility] EMERGENCY DEPARTMENT  EMERGENCY DEPARTMENT ENCOUNTER      Patient Name: Lina Nair  MRN: [id number]  Birthdate 1960  Date of Evaluation: 7/19/2025  Physician: Aubrey Allen MD    CHIEF COMPLAINT       Chief Complaint   Patient presents with    Knee Pain       HISTORY OF PRESENT ILLNESS   (Location/Symptom, Timing/Onset, Context/Setting, Quality, Duration, Modifying Factors, Severity)   Lina Nair, 65 y.o., female     65-year-old female presents with pain behind her left knee. Symptoms been ongoing for approximately one week. She denies trauma. She has had recent long travel. She denies other symptoms. [facility] was seated [facility] and reportedly had normal x-rays. She was referred to the ED for duplex.           Nursing Notes were reviewed.    REVIEW OF SYSTEMS    (Not required)   Review of Systems    Except as noted above the remainder of the review of systems was reviewed and negative.     PAST MEDICAL HISTORY     Past Medical History:   Diagnosis Date    Breast cancer (HCC) 2010    LEFT breast     H/O bone density study 12/29/17 Normal    Mononucleosis 3/27/13    Radiation therapy complication 2010    left breast ca       SURGICAL HISTORY       Past Surgical History:   Procedure Laterality Date    BIOPSY OF BREAST, NEEDLE CORE  11/2010    infiltrating ductal carcinoma Dr Meg Dimas    BREAST BIOPSY Left 2010    BREAST BIOPSY Left 2016    neg; stereotactic bx    BREAST LUMPECTOMY Left 2010    LEFT--Dr. Dimas    CHOLECYSTECTOMY  09/2008    COLONOSCOPY  6/11    normal, repaeat in 5 years    DILATION AND CURETTAGE OF UTERUS  6/5/14    with hysteroscopy for thickened endometrium       CURRENT MEDICATIONS       Previous Medications    MULTIPLE VITAMIN (MULTI VITAMIN PO)    1 tablet by mouth as directed Oral for 0    SERTRALINE (ZOLOFT) 50 MG TABLET    TAKE 1 TABLET BY MOUTH EVERY DAY    ZINC PO    Take by mouth       ALLERGIES     Patient has no known allergies.    FAMILY HISTORY    urgent intervention.     CONSULTS:  None    PROCEDURES:  Unless otherwise noted below, none     Procedures    FINAL IMPRESSION      1. Synovial cyst of left popliteal space          DISPOSITION/PLAN   DISPOSITION Decision To Discharge 07/19/2025 04:43:38 PM    PATIENT REFERRED TO:  Eugenio Molina MD  [id number] [facility] [address] [phone]    Schedule an appointment as soon as possible for a visit       [address] [address]  [phone]  Schedule an appointment as soon as possible for a visit in 1 day        DISCHARGE MEDICATIONS:  New Prescriptions    No medications on file     Controlled Substances Monitoring:          No data to display                (Please note that portions of this note were completed with a voice recognition program.  Efforts were made to edit the dictations but occasionally words are mis-transcribed.)    Aubrey Allen MD (electronically signed)  Attending Emergency Physician            Aubrey Allen MD  07/19/25 [id number]

## 2025-07-20 LAB — ECHO BSA: 1.97 M2
